# Patient Record
Sex: FEMALE | Race: BLACK OR AFRICAN AMERICAN | Employment: UNEMPLOYED | URBAN - METROPOLITAN AREA
[De-identification: names, ages, dates, MRNs, and addresses within clinical notes are randomized per-mention and may not be internally consistent; named-entity substitution may affect disease eponyms.]

---

## 2022-02-04 ENCOUNTER — OFFICE VISIT (OUTPATIENT)
Dept: PRIMARY CARE CLINIC | Age: 47
End: 2022-02-04
Payer: COMMERCIAL

## 2022-02-04 VITALS
OXYGEN SATURATION: 100 % | WEIGHT: 285.6 LBS | SYSTOLIC BLOOD PRESSURE: 137 MMHG | DIASTOLIC BLOOD PRESSURE: 85 MMHG | HEART RATE: 67 BPM | HEIGHT: 65 IN | BODY MASS INDEX: 47.58 KG/M2 | RESPIRATION RATE: 20 BRPM | TEMPERATURE: 97.8 F

## 2022-02-04 DIAGNOSIS — N32.81 OVERACTIVE BLADDER: ICD-10-CM

## 2022-02-04 DIAGNOSIS — M25.511 CHRONIC PAIN IN RIGHT SHOULDER: ICD-10-CM

## 2022-02-04 DIAGNOSIS — M54.42 CHRONIC MIDLINE LOW BACK PAIN WITH LEFT-SIDED SCIATICA: ICD-10-CM

## 2022-02-04 DIAGNOSIS — G89.29 CHRONIC MIDLINE LOW BACK PAIN WITH LEFT-SIDED SCIATICA: ICD-10-CM

## 2022-02-04 DIAGNOSIS — M06.9 RHEUMATOID ARTHRITIS INVOLVING MULTIPLE SITES, UNSPECIFIED WHETHER RHEUMATOID FACTOR PRESENT (HCC): Primary | ICD-10-CM

## 2022-02-04 DIAGNOSIS — I10 PRIMARY HYPERTENSION: ICD-10-CM

## 2022-02-04 DIAGNOSIS — G89.29 CHRONIC PAIN IN RIGHT SHOULDER: ICD-10-CM

## 2022-02-04 DIAGNOSIS — E66.01 MORBID OBESITY (HCC): ICD-10-CM

## 2022-02-04 PROBLEM — D50.9 IRON DEFICIENCY ANEMIA: Status: ACTIVE | Noted: 2017-03-02

## 2022-02-04 PROCEDURE — 99204 OFFICE O/P NEW MOD 45 MIN: CPT | Performed by: NURSE PRACTITIONER

## 2022-02-04 RX ORDER — TRAMADOL HYDROCHLORIDE 50 MG/1
50 TABLET ORAL
Qty: 21 TABLET | Refills: 0 | Status: SHIPPED | OUTPATIENT
Start: 2022-02-04 | End: 2022-02-11

## 2022-02-04 RX ORDER — ESTRADIOL 1 MG/1
TABLET ORAL
COMMUNITY
Start: 2022-02-01 | End: 2022-02-04 | Stop reason: ALTCHOICE

## 2022-02-04 RX ORDER — DICLOFENAC SODIUM 10 MG/G
2 GEL TOPICAL 4 TIMES DAILY
Qty: 450 G | Refills: 1 | Status: SHIPPED | OUTPATIENT
Start: 2022-02-04

## 2022-02-04 RX ORDER — DULOXETIN HYDROCHLORIDE 30 MG/1
30 CAPSULE, DELAYED RELEASE ORAL DAILY
Qty: 30 CAPSULE | Refills: 2 | Status: SHIPPED | OUTPATIENT
Start: 2022-02-04 | End: 2022-05-02

## 2022-02-04 NOTE — PROGRESS NOTES
Nathalia Red is a 50 y.o. female who presents to the office today for the following:    Chief Complaint   Patient presents with    New Patient    Hypertension       Past Medical History:   Diagnosis Date    Bursitis     Generalized anxiety disorder 2011    Hypertension     Iron deficiency anemia 3/2/2017    Major depression, single episode 10/26/2016    RA (rheumatoid arthritis) ([address]     Sleep apnea 2013       Past Surgical History:   Procedure Laterality Date    HX  SECTION      HX CHOLECYSTECTOMY      HX HYSTERECTOMY      HX SHOULDER ARTHROSCOPY      HX TONSIL AND ADENOIDECTOMY      HX TUBAL LIGATION          Family History   Problem Relation Age of Onset    Hypertension Mother     Kidney Disease Mother     Hypertension Father         Social History     Tobacco Use    Smoking status: Never Smoker    Smokeless tobacco: Never Used   Substance Use Topics    Alcohol use: Never    Drug use: Not on file        HPI  Patient here today as new patient to provider with PMH of RA, chronic back pain,anemia, sleep apnea, hypertension, anxiety,obesity,overactive bladder and chronic right shoulder pain s/p rotator cuff repair. States that she was following with a provider in [facility] but wants to switch pcp. Also followed by rheumatologist and orthopedic for right shoulder. Also has seen an orthopedic about her back as well. Reports that primary concerns have been regarding pain in low back as well as right shoulder. Had right shoulder rotator cuff repair in the past year and has finished PT. Still experiences pain however in shoulder. Pain in back radiates into legs and sometimes experiences tingling but not constant down legs. Has numbness in her right  thigh which has been unrelated to back pain and was present after she had hysterectomy. Told that a nerve was \"injured\" after hysterectomy. Given cortisone shots in back but these have not helped.  Has had trouble taking NSAIDS and tylenol not helpful. Was given tramadol by her rheumatologist several times and this does help. Could not tolerate gabapaentin or lyrica also tried in past. Has had xrays done of lumbar with last sometime in 2021. No MRI or CT done. Was advised by her rheumatologist that she needs to go to pain management. Current Outpatient Medications on File Prior to Visit   Medication Sig    oxybutynin (DITROPAN) 5 mg tablet Take 5 mg by mouth two (2) times a day.  methotrexate (RHEUMATREX) 2.5 mg tablet TAKE 8 TABLETS BY MOUTH EVERY 7 DAYS**4 TABS TUESDAY MORNING AND 4 TABS TUESDAY EVENING    losartan (COZAAR) 50 mg tablet Take 50 mg by mouth daily.  hydroCHLOROthiazide (HYDRODIURIL) 25 mg tablet Take 25 mg by mouth daily.  folic acid (FOLVITE) 1 mg tablet Take 1 mg by mouth daily.  Vitamin D2 1,250 mcg (50,000 unit) capsule take 1 capsule by mouth every week (Patient not taking: Reported on 2/4/2022)     No current facility-administered medications on file prior to visit. Medications Ordered Today   Medications    DULoxetine (CYMBALTA) 30 mg capsule     Sig: Take 1 Capsule by mouth daily. Dispense:  30 Capsule     Refill:  2    diclofenac (VOLTAREN) 1 % gel     Sig: Apply 2 g to affected area four (4) times daily. Dispense:  450 g     Refill:  1    traMADoL (ULTRAM) 50 mg tablet     Sig: Take 1 Tablet by mouth every eight (8) hours as needed for Pain for up to 7 days. Max Daily Amount: 150 mg. Dispense:  21 Tablet     Refill:  0        Review of Systems   Constitutional: Negative for chills, fever and weight loss. HENT: Negative. Eyes: Negative. Respiratory: Negative. Cardiovascular: Negative. Gastrointestinal: Negative. Genitourinary: Negative for dysuria, flank pain, frequency, hematuria and urgency. Musculoskeletal: Positive for back pain, joint pain and myalgias. Skin: Negative. Neurological: Positive for tingling.  Negative for dizziness, speech change, focal weakness, loss of consciousness, weakness and headaches. Visit Vitals  /85 (BP 1 Location: Left upper arm, BP Patient Position: Sitting, BP Cuff Size: Adult)   Pulse 67   Temp 97.8 °F (36.6 °C) (Temporal)   Resp 20   Ht 5' 5\" (1.651 m)   Wt 285 lb 9.6 oz (129.5 kg)   SpO2 100%   BMI 47.53 kg/m²       Physical Exam  Vitals and nursing note reviewed. Constitutional:       Appearance: Normal appearance. She is obese. Eyes:      Pupils: Pupils are equal, round, and reactive to light. Neck:      Vascular: No carotid bruit. Cardiovascular:      Rate and Rhythm: Normal rate and regular rhythm. Pulses: Normal pulses. Heart sounds: Normal heart sounds. Pulmonary:      Effort: Pulmonary effort is normal.      Breath sounds: Normal breath sounds. Abdominal:      General: Bowel sounds are normal.      Palpations: Abdomen is soft. Tenderness: There is no abdominal tenderness. There is no guarding. Musculoskeletal:      Cervical back: Normal.      Thoracic back: Normal.      Lumbar back: Spasms and tenderness present. Decreased range of motion. Positive right straight leg raise test and positive left straight leg raise test.      Right lower leg: No edema. Left lower leg: No edema. Lymphadenopathy:      Cervical: No cervical adenopathy. Skin:     General: Skin is warm and dry. Neurological:      Mental Status: She is alert and oriented to person, place, and time. Mental status is at baseline. Sensory: No sensory deficit. Gait: Gait abnormal.      Deep Tendon Reflexes: Reflexes abnormal.   Psychiatric:         Mood and Affect: Mood normal.         Behavior: Behavior normal.              1. Rheumatoid arthritis involving multiple sites, unspecified whether rheumatoid factor present ([facility])    - traMADoL (ULTRAM) 50 mg tablet; Take 1 Tablet by mouth every eight (8) hours as needed for Pain for up to 7 days. Max Daily Amount: 150 mg. Dispense: 21 Tablet;  Refill: 0  - CBC WITH AUTOMATED DIFF  - METABOLIC PANEL, COMPREHENSIVE  - URINALYSIS W/ RFLX MICROSCOPIC  - LIPID PANEL    2. Primary hypertension      3. Overactive bladder      4. Chronic midline low back pain with left-sided sciatica    - REFERRAL TO PHYSICAL THERAPY  - REFERRAL TO PAIN MANAGEMENT  - MRI LUMB SPINE WO CONT; Future  - DULoxetine (CYMBALTA) 30 mg capsule; Take 1 Capsule by mouth daily. Dispense: 30 Capsule; Refill: 2  - diclofenac (VOLTAREN) 1 % gel; Apply 2 g to affected area four (4) times daily. Dispense: 450 g; Refill: 1  - traMADoL (ULTRAM) 50 mg tablet; Take 1 Tablet by mouth every eight (8) hours as needed for Pain for up to 7 days. Max Daily Amount: 150 mg. Dispense: 21 Tablet; Refill: 0    5. Chronic pain in right shoulder    - REFERRAL TO PAIN MANAGEMENT    6. Morbid Obesity ([address]    Reviewed available notes in Epic from rheumatologist and an orthopedic she saw in 5/2021  Is on methotrexate for RA  She has completed PT and had right rotator cuff repair which has not relieved pain in shoulder  Also has had chronic pain in lumbar which she has received cortisone shots for and some PT as well  Report of xray of lumbar spine from  5/2021 states she appears to have normal alignment and SI Joints. Mild narrowing of the L4-5 and L5-S1 disc spaces with early spondylosis. Has not had MRI done though it was ordered in past. Given radicular symptoms, advised we can re-order MRI of lumbar to evaluate further  Will also refer back to PT to evaluate and treat  Encouraged to work on weight loss as this contributes and at this time she would likely not be a surgical candidate even if indicated. We discussed ways to help reduce excess carbs, fat and salt in diet along with staying active as possible  Could not tolerate gabapentin or lyrica in past per patient. Will try on cymbalta as feel this could be beneficial and she agrees.  Reviewed potential side effects and advised if develops mood changes or suicidal thoughts, notify provider or seek help immediately. Has been given tramadol several times and is requesting tramadol for pain today. Advised cannot prescribe this long term and I am going to refill once but informed to use only very sparingly   Will [facility] to [facility] to evaluate further  Recommend RTO for updated annual labs as not done in past year. Controlled Substance Monitoring:    RX Monitoring 2/4/2022   Periodic Controlled Substance Monitoring Possible medication side effects, risk of tolerance/dependence & alternative treatments discussed. ;No signs of potential drug abuse or diversion identified. Patient verbalizes understanding of plan of care as discussed above    Follow-up and Dispositions    · Return in about 4 weeks (around 3/4/2022) for or sooner for worsening symptoms.

## 2022-02-04 NOTE — PROGRESS NOTES
Chief Complaint   Patient presents with    New Patient    Hypertension     New pt, pt has an Rhuemtologist Dr. Vilma Canavan    1. Have you been to the ER, urgent care clinic since your last visit? Hospitalized since your last visit? No    2. Have you seen or consulted any other health care providers outside of the [address] [place] [address] since your last visit? Include any pap smears or colon screening.  Anmol

## 2022-02-13 PROBLEM — D50.9 IRON DEFICIENCY ANEMIA: Status: RESOLVED | Noted: 2017-03-02 | Resolved: 2022-02-13

## 2022-02-15 ENCOUNTER — HOSPITAL ENCOUNTER (OUTPATIENT)
Dept: MRI IMAGING | Age: 47
Discharge: HOME | End: 2022-02-15
Attending: NURSE PRACTITIONER
Payer: COMMERCIAL

## 2022-02-15 ENCOUNTER — HOSPITAL ENCOUNTER (OUTPATIENT)
Dept: PHYSICAL THERAPY | Age: 47
End: 2022-02-15

## 2022-02-15 DIAGNOSIS — M54.42 CHRONIC MIDLINE LOW BACK PAIN WITH LEFT-SIDED SCIATICA: ICD-10-CM

## 2022-02-15 DIAGNOSIS — G89.29 CHRONIC MIDLINE LOW BACK PAIN WITH LEFT-SIDED SCIATICA: ICD-10-CM

## 2022-02-15 PROCEDURE — 72148 MRI LUMBAR SPINE W/O DYE: CPT

## 2022-02-18 NOTE — PROGRESS NOTES
Please let patient know that MRI of lumbar does show degenerative disc disease at L4-L5 with focal disc protrusion on the right and possible compress of the right nerve. I did send referral to pain management and should carry copy of this.

## 2022-02-18 NOTE — PROGRESS NOTES
Patient informed of MRI results and recommendations per ANNALEE Bay NP. Patient stated she was coming by on Monday to  some papers and would go get disc that she is carrying to pain management. She did not have any questions or concerns.

## 2022-02-23 LAB
ALBUMIN SERPL-MCNC: 4.6 G/DL (ref 3.8–4.8)
ALBUMIN/GLOB SERPL: 1.4 {RATIO} (ref 1.2–2.2)
ALP SERPL-CCNC: 109 IU/L (ref 44–121)
ALT SERPL-CCNC: 12 IU/L (ref 0–32)
APPEARANCE UR: ABNORMAL
AST SERPL-CCNC: 29 IU/L (ref 0–40)
BASOPHILS # BLD AUTO: 0 X10E3/UL (ref 0–0.2)
BASOPHILS NFR BLD AUTO: 1 %
BILIRUB SERPL-MCNC: <0.2 MG/DL (ref 0–1.2)
BILIRUB UR QL STRIP: NEGATIVE
BUN SERPL-MCNC: 11 MG/DL (ref 6–24)
BUN/CREAT SERPL: 14 (ref 9–23)
CALCIUM SERPL-MCNC: 9.6 MG/DL (ref 8.7–10.2)
CHLORIDE SERPL-SCNC: 100 MMOL/L (ref 96–106)
CHOLEST SERPL-MCNC: 218 MG/DL (ref 100–199)
CO2 SERPL-SCNC: 26 MMOL/L (ref 20–29)
COLOR UR: YELLOW
CREAT SERPL-MCNC: 0.78 MG/DL (ref 0.57–1)
EOSINOPHIL # BLD AUTO: 0.1 X10E3/UL (ref 0–0.4)
EOSINOPHIL NFR BLD AUTO: 3 %
ERYTHROCYTE [DISTWIDTH] IN BLOOD BY AUTOMATED COUNT: 15.5 % (ref 11.7–15.4)
GLOBULIN SER CALC-MCNC: 3.2 G/DL (ref 1.5–4.5)
GLUCOSE SERPL-MCNC: 95 MG/DL (ref 65–99)
GLUCOSE UR QL STRIP: NEGATIVE
HCT VFR BLD AUTO: 36.6 % (ref 34–46.6)
HDLC SERPL-MCNC: 44 MG/DL
HGB BLD-MCNC: 11.7 G/DL (ref 11.1–15.9)
HGB UR QL STRIP: NEGATIVE
IMM GRANULOCYTES # BLD AUTO: 0 X10E3/UL (ref 0–0.1)
IMM GRANULOCYTES NFR BLD AUTO: 0 %
KETONES UR QL STRIP: NEGATIVE
LDLC SERPL CALC-MCNC: 141 MG/DL (ref 0–99)
LEUKOCYTE ESTERASE UR QL STRIP: NEGATIVE
LYMPHOCYTES # BLD AUTO: 2 X10E3/UL (ref 0.7–3.1)
LYMPHOCYTES NFR BLD AUTO: 38 %
MCH RBC QN AUTO: 25.4 PG (ref 26.6–33)
MCHC RBC AUTO-ENTMCNC: 32 G/DL (ref 31.5–35.7)
MCV RBC AUTO: 79 FL (ref 79–97)
MICRO URNS: ABNORMAL
MONOCYTES # BLD AUTO: 0.4 X10E3/UL (ref 0.1–0.9)
MONOCYTES NFR BLD AUTO: 7 %
NEUTROPHILS # BLD AUTO: 2.7 X10E3/UL (ref 1.4–7)
NEUTROPHILS NFR BLD AUTO: 51 %
NITRITE UR QL STRIP: NEGATIVE
PH UR STRIP: 6 [PH] (ref 5–7.5)
PLATELET # BLD AUTO: 232 X10E3/UL (ref 150–450)
POTASSIUM SERPL-SCNC: 4.5 MMOL/L (ref 3.5–5.2)
PROT SERPL-MCNC: 7.8 G/DL (ref 6–8.5)
PROT UR QL STRIP: NEGATIVE
RBC # BLD AUTO: 4.61 X10E6/UL (ref 3.77–5.28)
SODIUM SERPL-SCNC: 140 MMOL/L (ref 134–144)
SP GR UR STRIP: 1.02 (ref 1–1.03)
TRIGL SERPL-MCNC: 182 MG/DL (ref 0–149)
UROBILINOGEN UR STRIP-MCNC: 0.2 MG/DL (ref 0.2–1)
VLDLC SERPL CALC-MCNC: 33 MG/DL (ref 5–40)
WBC # BLD AUTO: 5.2 X10E3/UL (ref 3.4–10.8)

## 2022-02-23 NOTE — PROGRESS NOTES
Please let patient know that bad cholesterol is high at 141 with total 218. The 10-year ASCVD risk score (Riley Gutierrez., et al., 2013) is: 6.4% so I am recommending that she continue to work on following low cholesterol diet along with increasing exercise to help reduce. Repeat lipid panel in 6-12 months. Otherwise labs are stable, if any questions let me know.

## 2022-03-22 ENCOUNTER — OFFICE VISIT (OUTPATIENT)
Dept: PRIMARY CARE CLINIC | Age: 47
End: 2022-03-22
Payer: COMMERCIAL

## 2022-03-22 DIAGNOSIS — E66.01 MORBID OBESITY (HCC): ICD-10-CM

## 2022-03-22 DIAGNOSIS — M54.42 CHRONIC MIDLINE LOW BACK PAIN WITH LEFT-SIDED SCIATICA: Primary | ICD-10-CM

## 2022-03-22 DIAGNOSIS — G89.29 CHRONIC MIDLINE LOW BACK PAIN WITH LEFT-SIDED SCIATICA: Primary | ICD-10-CM

## 2022-03-22 PROCEDURE — 99214 OFFICE O/P EST MOD 30 MIN: CPT | Performed by: NURSE PRACTITIONER

## 2022-03-22 RX ORDER — ESTRADIOL 1 MG/1
1 TABLET ORAL DAILY
COMMUNITY
Start: 2022-02-28

## 2022-03-22 NOTE — PROGRESS NOTES
Chief Complaint   Patient presents with    Medication Evaluation    Hypertension     6 week f/u pt did not take her bp meds today yet    1. Have you been to the ER, urgent care clinic since your last visit? Hospitalized since your last visit? No    2. Have you seen or consulted any other health care providers outside of the [address] [place] [address] since your last visit? Include any pap smears or colon screening.  No

## 2022-03-22 NOTE — PROGRESS NOTES
Jennifer Thapa is a 50 y.o. female who presents to the office today for the following:    Chief Complaint   Patient presents with    Back Pain       Past Medical History:   Diagnosis Date    Bursitis     Generalized anxiety disorder 2011    Hypertension     Iron deficiency anemia 3/2/2017    Major depression, single episode 10/26/2016    RA [facility]oid [facility]) [facility]     Sleep apnea 2013       Past Surgical History:   Procedure Laterality Date    HX  SECTION      HX CHOLECYSTECTOMY      HX HYSTERECTOMY      HX SHOULDER ARTHROSCOPY      HX TONSIL AND ADENOIDECTOMY      HX TUBAL LIGATION          Family History   Problem Relation Age of Onset    Hypertension Mother     Kidney Disease Mother     Hypertension Father         Social History     Tobacco Use    Smoking status: Never Smoker    Smokeless tobacco: Never Used   Substance Use Topics    Alcohol use: Never    Drug use: Not on file        HPI  Patient here today for 1 month follow up of chronic back pain with  PMH of RA, chronic back pain,anemia, sleep apnea, hypertension, anxiety,obesity,overactive bladder and chronic right shoulder pain s/p rotator cuff repair. States that she was seen by [facility] and reports she was told to have drug test done as well to continue working on weight loss. Is not sure what plan may be outside of that. Did attempt to go to physical therapy but was not able to continue due to family conflict. Wants to see a spinal specialist as she does not feel she is getting adequate answers. Current Outpatient Medications on File Prior to Visit   Medication Sig    estradioL (ESTRACE) 1 mg tablet Take 1 mg by mouth daily.  DULoxetine (CYMBALTA) 30 mg capsule Take 1 Capsule by mouth daily.  diclofenac (VOLTAREN) 1 % gel Apply 2 g to affected area four (4) times daily.  oxybutynin (DITROPAN) 5 mg tablet Take 5 mg by mouth two (2) times a day.     methotrexate (RHEUMATREX) 2.5 mg tablet TAKE 8 TABLETS BY MOUTH EVERY 7 DAYS**4 TABS TUESDAY MORNING AND 4 TABS TUESDAY EVENING    losartan (COZAAR) 50 mg tablet Take 50 mg by mouth daily.  hydroCHLOROthiazide (HYDRODIURIL) 25 mg tablet Take 25 mg by mouth daily.  folic acid (FOLVITE) 1 mg tablet Take 1 mg by mouth daily. No current facility-administered medications on file prior to visit. No orders of the defined types were placed in this encounter. Review of Systems   Constitutional: Negative for chills, fever and weight loss. HENT: Negative. Eyes: Negative. Respiratory: Negative. Cardiovascular: Negative. Gastrointestinal: Negative. Genitourinary: Negative for dysuria, flank pain, frequency, hematuria and urgency. Musculoskeletal: Positive for back pain, joint pain and myalgias. Skin: Negative. Neurological: Positive for tingling. Negative for dizziness, speech change, focal weakness, loss of consciousness, weakness and headaches. Visit Vitals  /89   Pulse 74   Temp 97.3 °F (36.3 °C) (Temporal)   Resp 18   Ht 5' 5\" (1.651 m)   Wt 278 lb 3.2 oz (126.2 kg)   SpO2 98%   BMI 46.29 kg/m²       Physical Exam  Vitals and nursing note reviewed. Constitutional:       Appearance: Normal appearance. She is obese. Eyes:      Pupils: Pupils are equal, round, and reactive to light. Neck:      Vascular: No carotid bruit. Cardiovascular:      Rate and Rhythm: Normal rate and regular rhythm. Pulses: Normal pulses. Heart sounds: Normal heart sounds. Pulmonary:      Effort: Pulmonary effort is normal.      Breath sounds: Normal breath sounds. Abdominal:      General: Bowel sounds are normal.      Palpations: Abdomen is soft. Tenderness: There is no abdominal tenderness. There is no guarding. Musculoskeletal:      Cervical back: Normal.      Thoracic back: Normal.      Lumbar back: Spasms and tenderness present. Decreased range of motion.  Positive right straight leg raise test and positive left straight leg raise test.      Right lower leg: No edema. Left lower leg: No edema. Lymphadenopathy:      Cervical: No cervical adenopathy. Skin:     General: Skin is warm and dry. Neurological:      Mental Status: She is alert and oriented to person, place, and time. Mental status is at baseline. Sensory: No sensory deficit. Gait: Gait abnormal.      Deep Tendon Reflexes: Reflexes abnormal.   Psychiatric:         Mood and Affect: Mood normal.         Behavior: Behavior normal.            1. Chronic midline low back pain with left-sided sciatica  MRI Results (most recent):  Results from Hospital Encounter encounter on 02/15/22    MRI LUMB SPINE WO CONT    Narrative  MRI LUMBAR SPINE WITHOUT INTRAVENOUS CONTRAST:    CLINICAL HISTORY:  Back pain radiating into the left leg. T1 and T2 weighted images were obtained in the axial and sagittal planes. At the L1-L2 level , no disc herniation or central or foraminal stenoses is  noted. At the L2-L3 level, no disc herniation or central or foraminal stenoses is  noted. Mild degenerative changes of the facet joints are noted. At the L3-L4 level, mild bulging of the posterior annulus of the disc is noted. Mild to moderate degenerative changes of the facet joints are noted. No central  or foraminal stenoses is noted. At the L4-L5 level,  desiccation and mild narrowing of the disc space is noted. Central and to the right disc protrusion is noted with possible compression of  the right L5 nerve in the right lateral recess. Mild foraminal stenoses is noted  bilaterally. Mild degenerative changes of the facet joints and mild thickening  of the ligamentum flavum are causing mild central stenosis. At the L5-S1 level,  mild bulging of the posterior annulus of the disc is noted. Mild foraminal stenoses on the left is noted. Mild to moderate degenerative  changes of the facet joints are noted.  No significant central stenoses is noted. The conus medullaris  has a normal MR appearance. Tip of conus medullaris  terminates slightly above the L2-L3 disc space. The visualized bone marrow has a normal MR appearance. No acute or chronic  fracture or spondylolisthesis was demonstrated. Impression  Degenerative disc disease L4-L5 with  focal disc protrusion on the  right and possible compression of the right L5 nerve within the right lateral  recess. Mild central stenosis. - REFERRAL TO SPINE SURGERY    2. Morbid obesity ([address]      She is going to continue care with pain management but also requesting to see spinal surgeon for eval  Continue to encourage weight loss with decreasing excess fat, salt and sugar in diet along with getting low impact regular exercise as tolerated        Patient verbalizes understanding of plan of care as discussed above    Follow-up and Dispositions    · Return in about 3 months (around 6/22/2022) for or sooner for worsening symptoms.

## 2022-03-31 VITALS
DIASTOLIC BLOOD PRESSURE: 89 MMHG | HEIGHT: 65 IN | HEART RATE: 74 BPM | WEIGHT: 278.2 LBS | BODY MASS INDEX: 46.35 KG/M2 | RESPIRATION RATE: 18 BRPM | TEMPERATURE: 97.3 F | SYSTOLIC BLOOD PRESSURE: 125 MMHG | OXYGEN SATURATION: 98 %

## 2022-05-02 DIAGNOSIS — M54.42 CHRONIC MIDLINE LOW BACK PAIN WITH LEFT-SIDED SCIATICA: ICD-10-CM

## 2022-05-02 DIAGNOSIS — G89.29 CHRONIC MIDLINE LOW BACK PAIN WITH LEFT-SIDED SCIATICA: ICD-10-CM

## 2022-05-02 RX ORDER — DULOXETIN HYDROCHLORIDE 30 MG/1
CAPSULE, DELAYED RELEASE ORAL
Qty: 30 CAPSULE | Refills: 2 | Status: SHIPPED | OUTPATIENT
Start: 2022-05-02 | End: 2022-08-01

## 2022-05-31 NOTE — TELEPHONE ENCOUNTER
Requested Prescriptions     Pending Prescriptions Disp Refills    hydroCHLOROthiazide (HYDRODIURIL) 25 mg tablet       Sig: Take 1 Tablet by mouth daily.  losartan (COZAAR) 50 mg tablet       Sig: Take 1 Tablet by mouth daily.

## 2022-06-01 RX ORDER — HYDROCHLOROTHIAZIDE 25 MG/1
25 TABLET ORAL DAILY
Qty: 90 TABLET | Refills: 1 | Status: SHIPPED | OUTPATIENT
Start: 2022-06-01

## 2022-06-01 RX ORDER — LOSARTAN POTASSIUM 50 MG/1
50 TABLET ORAL DAILY
Qty: 90 TABLET | Refills: 1 | Status: SHIPPED | OUTPATIENT
Start: 2022-06-01

## 2022-06-06 ENCOUNTER — TELEPHONE (OUTPATIENT)
Dept: PRIMARY CARE CLINIC | Age: 47
End: 2022-06-06

## 2022-06-06 DIAGNOSIS — M54.50 ACUTE BILATERAL LOW BACK PAIN, UNSPECIFIED WHETHER SCIATICA PRESENT: Primary | ICD-10-CM

## 2022-06-06 RX ORDER — TRAMADOL HYDROCHLORIDE 50 MG/1
50 TABLET ORAL
Qty: 12 TABLET | Refills: 0 | Status: SHIPPED | OUTPATIENT
Start: 2022-06-06 | End: 2022-06-09

## 2022-06-06 NOTE — TELEPHONE ENCOUNTER
Spoke with patient via phone regarding concerns. I have reviewed the spine specialist notes from  (Dr. Ma Members). She was advised to continue care with Dr. Smith Daily prn for injections. Reports that injections have not relieved pain and she does not want to move forward with any surgery. Has tried multiple non-narcotic medication options and feels none have helped. Declines re-trial with gabapentin or Lyrica. NSAIDS need to be limited due to problems with stomach. Does not feel tylenol, topical rubs/lidocaine patches effective. Feels that she is not being helped adequately by pain management provider (Dr. Smith Daily). Has been to Dr. Dagmar Fuentes with pain management also. Is requesting pain medication and as we had discussed previously, long term opioid medication cannot be prescribed by provider which is reason for referrals to pain management. I have agreed to a short course of tramadol that she may use sparingly due to reported acute pain episode over past several days. Reviewed side effects and not to drive or operate machinery when taking. Also should not take with other sedating medication as could be life threatening. We discussed referral to another pain specialist in [place] for additional opinion as she does not feel she is being helped but declines. She will let us know at later date if agrees. Did advise that we would like to continue helping her with medical needs otherwise.

## 2022-06-13 ENCOUNTER — TELEPHONE (OUTPATIENT)
Dept: PRIMARY CARE CLINIC | Age: 47
End: 2022-06-13

## 2022-06-13 NOTE — TELEPHONE ENCOUNTER
Please contact Dr. Yaneli Prater office regarding patient. I received notes discussing evaluation of chronic back pain. Patient pain still uncontrolled and it was noted that she was advised to follow up with pcp for medication options. We have attempted all non-narcotic options and was referred there to evaluate for pain control as standard treatments not effective. Would like to see if he can contact patient to discuss recommendations for pain control or if he has recommendations on who she should see as pain is not controlled with non-narcotic options.

## 2022-06-13 NOTE — TELEPHONE ENCOUNTER
Spoke with RASHMI at [phone] (Dr. Meza Sage) and asked if she would give him a message stating, We have attempted all non-narcotic options and patient was referred there to be evaluated for pain control as standard treatments are not effective. Would like to know if he can reach out to patient to discuss recommendations for pain control or if can recommend someone she should see as pain is not controlled with non-narcotic options. Rashmi stated understanding.

## 2022-08-01 DIAGNOSIS — G89.29 CHRONIC MIDLINE LOW BACK PAIN WITH LEFT-SIDED SCIATICA: ICD-10-CM

## 2022-08-01 DIAGNOSIS — M54.42 CHRONIC MIDLINE LOW BACK PAIN WITH LEFT-SIDED SCIATICA: ICD-10-CM

## 2022-08-01 RX ORDER — DULOXETIN HYDROCHLORIDE 30 MG/1
CAPSULE, DELAYED RELEASE ORAL
Qty: 30 CAPSULE | Refills: 2 | Status: SHIPPED | OUTPATIENT
Start: 2022-08-01

## 2022-09-14 ENCOUNTER — OFFICE VISIT (OUTPATIENT)
Dept: PRIMARY CARE CLINIC | Age: 47
End: 2022-09-14
Payer: COMMERCIAL

## 2022-09-14 VITALS
RESPIRATION RATE: 20 BRPM | HEART RATE: 81 BPM | HEIGHT: 65 IN | BODY MASS INDEX: 43.99 KG/M2 | WEIGHT: 264 LBS | OXYGEN SATURATION: 97 % | SYSTOLIC BLOOD PRESSURE: 139 MMHG | DIASTOLIC BLOOD PRESSURE: 87 MMHG | TEMPERATURE: 97.4 F

## 2022-09-14 DIAGNOSIS — M54.42 CHRONIC MIDLINE LOW BACK PAIN WITH LEFT-SIDED SCIATICA: Primary | ICD-10-CM

## 2022-09-14 DIAGNOSIS — G89.29 CHRONIC MIDLINE LOW BACK PAIN WITH LEFT-SIDED SCIATICA: Primary | ICD-10-CM

## 2022-09-14 DIAGNOSIS — E66.01 MORBID OBESITY (HCC): ICD-10-CM

## 2022-09-14 PROBLEM — M19.011 ARTHRITIS OF RIGHT ACROMIOCLAVICULAR JOINT: Status: ACTIVE | Noted: 2020-06-30

## 2022-09-14 PROCEDURE — 99213 OFFICE O/P EST LOW 20 MIN: CPT | Performed by: NURSE PRACTITIONER

## 2022-09-14 NOTE — PROGRESS NOTES
Chief Complaint   Patient presents with    Back Pain    Arthritis    Bursitis     Been like this for months just getting worse     1. Have you been to the ER, urgent care clinic since your last visit? Hospitalized since your last visit? No    2. Have you seen or consulted any other health care providers outside of the [address] [place] [address] since your last visit? Include any pap smears or colon screening.   In chart

## 2022-11-02 ENCOUNTER — TELEPHONE (OUTPATIENT)
Dept: PRIMARY CARE CLINIC | Age: 47
End: 2022-11-02

## 2022-11-02 DIAGNOSIS — N32.81 OVERACTIVE BLADDER: Primary | ICD-10-CM

## 2022-11-02 RX ORDER — OXYBUTYNIN CHLORIDE 5 MG/1
5 TABLET ORAL 2 TIMES DAILY
Qty: 180 TABLET | Refills: 1 | Status: SHIPPED | OUTPATIENT
Start: 2022-11-02

## 2022-11-07 DIAGNOSIS — M54.42 CHRONIC MIDLINE LOW BACK PAIN WITH LEFT-SIDED SCIATICA: ICD-10-CM

## 2022-11-07 DIAGNOSIS — G89.29 CHRONIC MIDLINE LOW BACK PAIN WITH LEFT-SIDED SCIATICA: ICD-10-CM

## 2022-11-07 RX ORDER — DULOXETIN HYDROCHLORIDE 30 MG/1
CAPSULE, DELAYED RELEASE ORAL
Qty: 30 CAPSULE | Refills: 2 | Status: SHIPPED | OUTPATIENT
Start: 2022-11-07

## 2022-12-07 RX ORDER — LOSARTAN POTASSIUM 50 MG/1
TABLET ORAL
Qty: 90 TABLET | Refills: 1 | Status: SHIPPED | OUTPATIENT
Start: 2022-12-07

## 2022-12-15 ENCOUNTER — HOSPITAL ENCOUNTER (EMERGENCY)
Age: 47
Discharge: HOME | End: 2022-12-15
Attending: EMERGENCY MEDICINE
Payer: COMMERCIAL

## 2022-12-15 ENCOUNTER — APPOINTMENT (OUTPATIENT)
Dept: GENERAL RADIOLOGY | Age: 47
End: 2022-12-15
Attending: EMERGENCY MEDICINE
Payer: COMMERCIAL

## 2022-12-15 VITALS
TEMPERATURE: 98.1 F | BODY MASS INDEX: 44.15 KG/M2 | DIASTOLIC BLOOD PRESSURE: 104 MMHG | RESPIRATION RATE: 18 BRPM | SYSTOLIC BLOOD PRESSURE: 163 MMHG | WEIGHT: 265 LBS | HEIGHT: 65 IN | HEART RATE: 68 BPM | OXYGEN SATURATION: 100 %

## 2022-12-15 DIAGNOSIS — S86.912A KNEE STRAIN, LEFT, INITIAL ENCOUNTER: Primary | ICD-10-CM

## 2022-12-15 PROCEDURE — 99284 EMERGENCY DEPT VISIT MOD MDM: CPT

## 2022-12-15 PROCEDURE — 73560 X-RAY EXAM OF KNEE 1 OR 2: CPT

## 2022-12-15 PROCEDURE — 96372 THER/PROPH/DIAG INJ SC/IM: CPT

## 2022-12-15 PROCEDURE — 74011250636 HC RX REV CODE- 250/636

## 2022-12-15 RX ORDER — KETOROLAC TROMETHAMINE 30 MG/ML
60 INJECTION, SOLUTION INTRAMUSCULAR; INTRAVENOUS
Status: COMPLETED | OUTPATIENT
Start: 2022-12-15 | End: 2022-12-15

## 2022-12-15 RX ORDER — KETOROLAC TROMETHAMINE 30 MG/ML
INJECTION, SOLUTION INTRAMUSCULAR; INTRAVENOUS
Status: COMPLETED
Start: 2022-12-15 | End: 2022-12-15

## 2022-12-15 RX ADMIN — KETOROLAC TROMETHAMINE 60 MG: 30 INJECTION, SOLUTION INTRAMUSCULAR; INTRAVENOUS at 14:05

## 2022-12-15 RX ADMIN — KETOROLAC TROMETHAMINE 60 MG: 30 INJECTION, SOLUTION INTRAMUSCULAR at 14:05

## 2022-12-15 NOTE — ED TRIAGE NOTES
Pt reports left knee pain after feeling a \"pop\" this morning. Pt rates pain 8/10. Arrived with brace from home. Pt did not take any meds PTA.

## 2022-12-15 NOTE — ED PROVIDER NOTES
EMERGENCY DEPARTMENT HISTORY AND PHYSICAL EXAM      Date: 12/15/2022  Patient Name: Sarkis Sena    History of Presenting Illness     Chief Complaint   Patient presents with    Knee Pain       History Provided By: Patient    HPI: Sarkis Matthews. Tania Thapa, 50 y.o. female past medical history significant for rheumatoid arthritis, hypertension, obesity, patient states she was ambulating today when she felt a sudden pop and experiencing excruciating 10/10 pain to left knee, patient needed to put a brace on to ambulate better    There are no other complaints, changes, or physical findings at this time. Past History     Past Medical History:  Past Medical History:   Diagnosis Date    Bursitis     Generalized anxiety disorder 2011    Hypertension     Iron deficiency anemia 3/2/2017    Major depression, single episode 10/26/2016    RA [facility]umat[facility]     Sleep apnea 2013       Past Surgical History:  Past Surgical History:   Procedure Laterality Date    HX  SECTION      HX CHOLECYSTECTOMY      HX HYSTERECTOMY      HX SHOULDER ARTHROSCOPY      HX TONSIL AND ADENOIDECTOMY      HX TUBAL LIGATION         Family History:  Family History   Problem Relation Age of Onset    Hypertension Mother     Kidney Disease Mother     Hypertension Father        Social History:  Social History     Tobacco Use    Smoking status: Never    Smokeless tobacco: Never   Substance Use Topics    Alcohol use: Never       Allergies: Allergies   Allergen Reactions    Hydromorphone Other (comments)       PCP: Zayra Paulson NP    No current facility-administered medications on file prior to encounter.      Current Outpatient Medications on File Prior to Encounter   Medication Sig Dispense Refill    losartan (COZAAR) 50 mg tablet take 1 tablet by mouth once daily 90 Tablet 1    DULoxetine (CYMBALTA) 30 mg capsule take 1 capsule by mouth once daily 30 Capsule 2    oxybutynin (DITROPAN) 5 mg tablet Take 1 Tablet by mouth two (2) times a day. 180 Tablet 1    hydroCHLOROthiazide (HYDRODIURIL) 25 mg tablet Take 1 Tablet by mouth daily. 90 Tablet 1    estradioL (ESTRACE) 1 mg tablet Take 1 mg by mouth daily. diclofenac (VOLTAREN) 1 % gel Apply 2 g to affected area four (4) times daily. 450 g 1    methotrexate (RHEUMATREX) 2.5 mg tablet TAKE 8 TABLETS BY MOUTH EVERY 7 DAYS**4 TABS TUESDAY MORNING AND 4 TABS TUESDAY EVENING      folic acid (FOLVITE) 1 mg tablet Take 1 mg by mouth daily. Review of Systems   Review of Systems   Constitutional:  Negative for chills and fever. HENT:  Negative for rhinorrhea and sore throat. Eyes:  Negative for pain and visual disturbance. Respiratory:  Negative for cough and shortness of breath. Cardiovascular:  Negative for chest pain and leg swelling. Gastrointestinal:  Negative for abdominal pain and vomiting. Endocrine: Negative for polydipsia and polyuria. Genitourinary:  Negative for dysuria and hematuria. Musculoskeletal:  Positive for arthralgias. Negative for back pain and neck pain. Skin:  Negative for color change and pallor. Neurological:  Negative for weakness and headaches. Psychiatric/Behavioral:  Negative for agitation and suicidal ideas. Physical Exam   Physical Exam  Vitals and nursing note reviewed. Constitutional:       General: She is not in acute distress. Appearance: She is morbidly obese. She is not ill-appearing, toxic-appearing or diaphoretic. HENT:      Head: Normocephalic and atraumatic. Right Ear: Tympanic membrane normal.      Left Ear: Tympanic membrane normal.      Nose: Nose normal. No congestion. Mouth/Throat:      Mouth: Mucous membranes are moist.      Pharynx: Oropharynx is clear. Eyes:      Extraocular Movements: Extraocular movements intact. Conjunctiva/sclera: Conjunctivae normal.      Pupils: Pupils are equal, round, and reactive to light. Cardiovascular:      Rate and Rhythm: Normal rate and regular rhythm. Pulses: Normal pulses. Heart sounds: Normal heart sounds. Pulmonary:      Effort: Pulmonary effort is normal.      Breath sounds: Normal breath sounds. Abdominal:      General: Bowel sounds are normal.      Palpations: Abdomen is soft. Tenderness: There is no abdominal tenderness. Musculoskeletal:         General: No deformity or signs of injury. Cervical back: Normal range of motion and neck supple. No rigidity or tenderness. Right knee: Normal.      Left knee: Swelling present. No deformity or bony tenderness. Decreased range of motion. Tenderness present over the medial joint line. Lymphadenopathy:      Cervical: No cervical adenopathy. Skin:     General: Skin is warm and dry. Capillary Refill: Capillary refill takes less than 2 seconds. Findings: No rash. Neurological:      General: No focal deficit present. Mental Status: She is alert and oriented to person, place, and time. Cranial Nerves: No cranial nerve deficit. Sensory: No sensory deficit. Psychiatric:         Mood and Affect: Mood normal.         Behavior: Behavior normal.       Lab and Diagnostic Study Results   Labs -   No results found for this or any previous visit (from the past 12 hour(s)). Radiologic Studies -   @lastxrresult@  CT Results  (Last 48 hours)      None          CXR Results  (Last 48 hours)      None            Medical Decision Making and ED Course   Differential Diagnosis & Medical Decision Making Provider Note:   Knee injury DDx closed fracture, joint dislocation, ligamentous injury    - I am the first provider for this patient. I reviewed the vital signs, available nursing notes, past medical history, past surgical history, family history and social history. The patients presenting problems have been discussed, and they are in agreement with the care plan formulated and outlined with them.   I have encouraged them to ask questions as they arise throughout their visit.    Vital Signs-Reviewed the patient's vital signs. Patient Vitals for the past 12 hrs:   Temp Pulse Resp BP SpO2   12/15/22 1346 98.1 °F (36.7 °C) 83 17 (!) 198/98 100 %       ED Course:        HYPERTENSION COUNSELING: Education was provided to the patient today regarding their hypertension. Patient is made aware of their elevated blood pressure and is instructed to follow up this week with their Primary Care for a recheck. Patient is counseled regarding consequences of chronic, uncontrolled hypertension including kidney disease, heart disease, stroke or even death. Patient states their understanding and agrees to follow up this week. Additionally, during their visit, I discussed sodium restriction, maintaining ideal body weight and regular exercise program as physiologic means to achieve blood pressure control. The patient will strive towards this. Procedures   Performed by: mEily Montesinos MD  Procedures      Disposition   Disposition: Condition stable and improved  DC- Adult Discharges: All of the diagnostic tests were reviewed and questions answered. Diagnosis, care plan and treatment options were discussed. The patient understands the instructions and will follow up as directed. The patients results have been reviewed with them. They have been counseled regarding their diagnosis. The patient verbally convey understanding and agreement of the signs, symptoms, diagnosis, treatment and prognosis and additionally agrees to follow up as recommended with their PCP in 24 - 48 hours. They also agree with the care-plan and convey that all of their questions have been answered. I have also put together some discharge instructions for them that include: 1) educational information regarding their diagnosis, 2) how to care for their diagnosis at home, as well a 3) list of reasons why they would want to return to the ED prior to their follow-up appointment, should their condition change.     DISCHARGE PLAN:  1. Current Discharge Medication List        CONTINUE these medications which have NOT CHANGED    Details   losartan (COZAAR) 50 mg tablet take 1 tablet by mouth once daily  Qty: 90 Tablet, Refills: 1      DULoxetine (CYMBALTA) 30 mg capsule take 1 capsule by mouth once daily  Qty: 30 Capsule, Refills: 2    Associated Diagnoses: Chronic midline low back pain with left-sided sciatica      oxybutynin (DITROPAN) 5 mg tablet Take 1 Tablet by mouth two (2) times a day. Qty: 180 Tablet, Refills: 1    Associated Diagnoses: Overactive bladder      hydroCHLOROthiazide (HYDRODIURIL) 25 mg tablet Take 1 Tablet by mouth daily. Qty: 90 Tablet, Refills: 1      estradioL (ESTRACE) 1 mg tablet Take 1 mg by mouth daily. diclofenac (VOLTAREN) 1 % gel Apply 2 g to affected area four (4) times daily. Qty: 450 g, Refills: 1    Associated Diagnoses: Chronic midline low back pain with left-sided sciatica      methotrexate (RHEUMATREX) 2.5 mg tablet TAKE 8 TABLETS BY MOUTH EVERY 7 DAYS**4 TABS TUESDAY MORNING AND 4 TABS TUESDAY EVENING      folic acid (FOLVITE) 1 mg tablet Take 1 mg by mouth daily. 2.   Follow-up Information    None       3. Return to ED if worse   4. Current Discharge Medication List         Remove if admitted/transferred    Diagnosis/Clinical Impression     Clinical Impression: No diagnosis found. Attestations: Rickie DAVILA MD, am the primary clinician of record. Please note that this dictation was completed with [employer], the computer voice recognition software. Quite often unanticipated grammatical, syntax, homophones, and other interpretive errors are inadvertently transcribed by the computer software. Please disregard these errors. Please excuse any errors that have escaped final proofreading. Thank you.

## 2022-12-28 RX ORDER — HYDROCHLOROTHIAZIDE 25 MG/1
TABLET ORAL
Qty: 90 TABLET | Refills: 1 | Status: SHIPPED | OUTPATIENT
Start: 2022-12-28

## 2023-01-23 NOTE — PROGRESS NOTES
Called pharmacy to clarify prescription, it should be once a day.    Jennifer Pitts is a 50 y.o. female who presents to the office today for the following:    Chief Complaint   Patient presents with    Back Pain       Past Medical History:   Diagnosis Date    Bursitis     Generalized anxiety disorder 2011    Hypertension     Iron deficiency anemia 3/2/2017    Major depression, single episode 10/26/2016    RA (rheumatoid arthritis) ([facility]     Sleep apnea 2013       Past Surgical History:   Procedure Laterality Date    HX  SECTION      HX CHOLECYSTECTOMY      HX HYSTERECTOMY      HX SHOULDER ARTHROSCOPY      HX TONSIL AND ADENOIDECTOMY      HX TUBAL LIGATION          Family History   Problem Relation Age of Onset    Hypertension Mother     Kidney Disease Mother     Hypertension Father         Social History     Tobacco Use    Smoking status: Never    Smokeless tobacco: Never   Substance Use Topics    Alcohol use: Never        HPI  Patient here today for follow up of chronic back pain with  PMH of RA, chronic back pain,anemia, sleep apnea, hypertension, anxiety,obesity,overactive bladder and chronic right shoulder pain s/p rotator cuff repair. States that back pain remains persistent and having difficulty with normal activities due to pain. Has attempted physical therapy and medication options without much improvement. Was advised regarding possible injections or surgical intervention but does not want to proceed with any surgery as she is concerned this may not be successful. Is taking cymbalta and tylenol prn    Current Outpatient Medications on File Prior to Visit   Medication Sig    DULoxetine (CYMBALTA) 30 mg capsule take 1 capsule by mouth once daily    hydroCHLOROthiazide (HYDRODIURIL) 25 mg tablet Take 1 Tablet by mouth daily. losartan (COZAAR) 50 mg tablet Take 1 Tablet by mouth daily. estradioL (ESTRACE) 1 mg tablet Take 1 mg by mouth daily. diclofenac (VOLTAREN) 1 % gel Apply 2 g to affected area four (4) times daily.     oxybutynin (DITROPAN) 5 mg tablet Take 5 mg by mouth two (2) times a day. methotrexate (RHEUMATREX) 2.5 mg tablet TAKE 8 TABLETS BY MOUTH EVERY 7 DAYS**4 TABS TUESDAY MORNING AND 4 TABS TUESDAY EVENING    folic acid (FOLVITE) 1 mg tablet Take 1 mg by mouth daily. No current facility-administered medications on file prior to visit. No orders of the defined types were placed in this encounter. Review of Systems   Constitutional:  Negative for chills, fever and weight loss. HENT: Negative. Eyes: Negative. Respiratory: Negative. Cardiovascular: Negative. Gastrointestinal: Negative. Genitourinary:  Negative for dysuria, flank pain, frequency, hematuria and urgency. Musculoskeletal:  Positive for back pain, joint pain and myalgias. Skin: Negative. Neurological:  Positive for tingling. Negative for dizziness, speech change, focal weakness, loss of consciousness, weakness and headaches. Visit Vitals  /87 (BP 1 Location: Left upper arm, BP Patient Position: Sitting, BP Cuff Size: Large adult)   Pulse 81   Temp 97.4 °F (36.3 °C) (Temporal)   Resp 20   Ht 5' 5\" (1.651 m)   Wt 264 lb (119.7 kg)   SpO2 97%   BMI 43.93 kg/m²       Physical Exam  Vitals and nursing note reviewed. Constitutional:       Appearance: Normal appearance. She is obese. Eyes:      Pupils: Pupils are equal, round, and reactive to light. Neck:      Vascular: No carotid bruit. Cardiovascular:      Rate and Rhythm: Normal rate and regular rhythm. Pulses: Normal pulses. Heart sounds: Normal heart sounds. Pulmonary:      Effort: Pulmonary effort is normal.      Breath sounds: Normal breath sounds. Abdominal:      General: Bowel sounds are normal.      Palpations: Abdomen is soft. Tenderness: There is no abdominal tenderness. There is no guarding. Musculoskeletal:      Cervical back: Normal.      Thoracic back: Normal.      Lumbar back: Spasms and tenderness present. Decreased range of motion.  Positive right straight leg raise test and positive left straight leg raise test.      Right lower leg: No edema. Left lower leg: No edema. Lymphadenopathy:      Cervical: No cervical adenopathy. Skin:     General: Skin is warm and dry. Neurological:      Mental Status: She is alert and oriented to person, place, and time. Mental status is at baseline. Sensory: No sensory deficit. Gait: Gait abnormal.      Deep Tendon Reflexes: Reflexes abnormal.   Psychiatric:         Mood and Affect: Mood normal.         Behavior: Behavior normal.          1. Chronic midline low back pain with left-sided sciatica  MRI Results (most recent):  Results from Hospital Encounter encounter on 02/15/22    MRI LUMB SPINE WO CONT    Narrative  MRI LUMBAR SPINE WITHOUT INTRAVENOUS CONTRAST:    CLINICAL HISTORY:  Back pain radiating into the left leg. T1 and T2 weighted images were obtained in the axial and sagittal planes. At the L1-L2 level , no disc herniation or central or foraminal stenoses is  noted. At the L2-L3 level, no disc herniation or central or foraminal stenoses is  noted. Mild degenerative changes of the facet joints are noted. At the L3-L4 level, mild bulging of the posterior annulus of the disc is noted. Mild to moderate degenerative changes of the facet joints are noted. No central  or foraminal stenoses is noted. At the L4-L5 level,  desiccation and mild narrowing of the disc space is noted. Central and to the right disc protrusion is noted with possible compression of  the right L5 nerve in the right lateral recess. Mild foraminal stenoses is noted  bilaterally. Mild degenerative changes of the facet joints and mild thickening  of the ligamentum flavum are causing mild central stenosis. At the L5-S1 level,  mild bulging of the posterior annulus of the disc is noted. Mild foraminal stenoses on the left is noted. Mild to moderate degenerative  changes of the facet joints are noted.  No significant central stenoses is noted. The conus medullaris  has a normal MR appearance. Tip of conus medullaris  terminates slightly above the L2-L3 disc space. The visualized bone marrow has a normal MR appearance. No acute or chronic  fracture or spondylolisthesis was demonstrated. Impression  Degenerative disc disease L4-L5 with  focal disc protrusion on the  right and possible compression of the right L5 nerve within the right lateral  recess. Mild central stenosis. 2. Morbid obesity ([address]      She would like to be evaluated with another pain management provider and will send referral today  Continue to encourage weight loss with decreasing excess fat, salt and sugar in diet along with getting low impact regular exercise as tolerated  Continue tylenol , lidocaine patches and heat prn  Encourage home exercises  She is on Cymbalta and not tolerant of gabapentin or lyrica in past        Patient verbalizes understanding of plan of care as discussed above    Follow-up and Dispositions    Return in about 3 months (around 12/14/2022) for or sooner for worsening symptoms.

## 2023-01-27 ENCOUNTER — OFFICE VISIT (OUTPATIENT)
Dept: PRIMARY CARE CLINIC | Age: 48
End: 2023-01-27
Payer: COMMERCIAL

## 2023-01-27 VITALS
TEMPERATURE: 98.1 F | WEIGHT: 289.8 LBS | HEART RATE: 79 BPM | SYSTOLIC BLOOD PRESSURE: 146 MMHG | BODY MASS INDEX: 48.28 KG/M2 | HEIGHT: 65 IN | RESPIRATION RATE: 18 BRPM | OXYGEN SATURATION: 95 % | DIASTOLIC BLOOD PRESSURE: 82 MMHG

## 2023-01-27 DIAGNOSIS — M25.561 ACUTE PAIN OF RIGHT KNEE: ICD-10-CM

## 2023-01-27 DIAGNOSIS — F32.A DEPRESSION, UNSPECIFIED DEPRESSION TYPE: Primary | ICD-10-CM

## 2023-01-27 DIAGNOSIS — M06.9 RHEUMATOID ARTHRITIS INVOLVING RIGHT KNEE, UNSPECIFIED WHETHER RHEUMATOID FACTOR PRESENT (HCC): ICD-10-CM

## 2023-01-27 PROCEDURE — 3074F SYST BP LT 130 MM HG: CPT | Performed by: NURSE PRACTITIONER

## 2023-01-27 PROCEDURE — 3078F DIAST BP <80 MM HG: CPT | Performed by: NURSE PRACTITIONER

## 2023-01-27 PROCEDURE — 99214 OFFICE O/P EST MOD 30 MIN: CPT | Performed by: NURSE PRACTITIONER

## 2023-01-27 RX ORDER — TRAMADOL HYDROCHLORIDE 50 MG/1
50 TABLET ORAL
Qty: 12 TABLET | Refills: 0 | Status: SHIPPED | OUTPATIENT
Start: 2023-01-27 | End: 2023-01-30

## 2023-01-27 RX ORDER — PREDNISONE 20 MG/1
TABLET ORAL
Qty: 9 TABLET | Refills: 0 | Status: SHIPPED | OUTPATIENT
Start: 2023-01-27

## 2023-01-27 RX ORDER — BUPROPION HYDROCHLORIDE 150 MG/1
150 TABLET ORAL DAILY
Qty: 30 TABLET | Refills: 2 | Status: SHIPPED | OUTPATIENT
Start: 2023-01-27

## 2023-01-27 NOTE — PROGRESS NOTES
Chief Complaint   Patient presents with    Knee Pain    ED Follow-up     Pt stated she was just walking form her bedroom to living room and turned her knee wrong. Pt did not take any of her BP meds today       1. Have you been to the ER, urgent care clinic since your last visit? Hospitalized since your last visit? In chart     2. Have you seen or consulted any other health care providers outside of the [address] [place] [address] since your last visit? Include any pap smears or colon screening.  No

## 2023-01-31 NOTE — PROGRESS NOTES
Jennifer MANTILLA Brionna Lilly is a 46 y.o. female who presents to the office today for the following:    Chief Complaint   Patient presents with    Knee Pain    ED Follow-up    Depression       Past Medical History:   Diagnosis Date    Bursitis     Generalized anxiety disorder 2011    Hypertension     Iron deficiency anemia 3/2/2017    Major depression, single episode 10/26/2016    RA (rheumatoid arthritis) ([address]     Sleep apnea 2013       Past Surgical History:   Procedure Laterality Date    HX  SECTION      HX CHOLECYSTECTOMY      HX HYSTERECTOMY      HX SHOULDER ARTHROSCOPY      HX TONSIL AND ADENOIDECTOMY      HX TUBAL LIGATION          Family History   Problem Relation Age of Onset    Hypertension Mother     Kidney Disease Mother     Hypertension Father         Social History     Tobacco Use    Smoking status: Never    Smokeless tobacco: Never   Substance Use Topics    Alcohol use: Never        HPI  Patient here today with concerns regarding right knee pain over the past week. States that she was walking across room when felt sudden pain. Has been hurting persistently since then. Was having pain in the left knee about a month ago when seen in ED but this is improved. Has history of rheumatoid arthritis but has not seen rheumatologist recently. Does report increased depression over the past several months. Denies any suicidal ideations but has had low motivation and weight gain as a result. Feels limited by her joint pains and weight. Has been diagnosed with depression in past and was put on cymbalta to help with joint pain and depression. Does not feel this works as well as it used to.      Current Outpatient Medications on File Prior to Visit   Medication Sig    hydroCHLOROthiazide (HYDRODIURIL) 25 mg tablet take 1 tablet by mouth once daily    losartan (COZAAR) 50 mg tablet take 1 tablet by mouth once daily    DULoxetine (CYMBALTA) 30 mg capsule take 1 capsule by mouth once daily    oxybutynin (DITROPAN) 5 mg tablet Take 1 Tablet by mouth two (2) times a day. estradioL (ESTRACE) 1 mg tablet Take 1 mg by mouth daily. diclofenac (VOLTAREN) 1 % gel Apply 2 g to affected area four (4) times daily. methotrexate (RHEUMATREX) 2.5 mg tablet TAKE 8 TABLETS BY MOUTH EVERY 7 DAYS**4 TABS TUESDAY MORNING AND 4 TABS TUESDAY EVENING    folic acid (FOLVITE) 1 mg tablet Take 1 mg by mouth daily. No current facility-administered medications on file prior to visit. Medications Ordered Today   Medications    predniSONE (DELTASONE) 20 mg tablet     Sig: Take 2 tablets by mouth x 3 days then 1 tablet by mouth x 3 days     Dispense:  9 Tablet     Refill:  0    buPROPion XL (WELLBUTRIN XL) 150 mg tablet     Sig: Take 1 Tablet by mouth daily. Dispense:  30 Tablet     Refill:  2    traMADoL (ULTRAM) 50 mg tablet     Sig: Take 1 Tablet by mouth every six (6) hours as needed for Pain for up to 3 days. Max Daily Amount: 200 mg. Dispense:  12 Tablet     Refill:  0        Review of Systems   Constitutional: Negative. Respiratory: Negative. Cardiovascular: Negative. Gastrointestinal: Negative. Genitourinary: Negative. Musculoskeletal:  Positive for back pain, joint pain and myalgias. Negative for falls. Neurological:  Negative for tingling. Psychiatric/Behavioral:  Positive for depression. Negative for hallucinations, substance abuse and suicidal ideas. The patient is not nervous/anxious and does not have insomnia. Visit Vitals  BP (!) 146/82 (BP 1 Location: Left upper arm, BP Patient Position: Sitting, BP Cuff Size: Large adult)   Pulse 79   Temp 98.1 °F (36.7 °C) (Temporal)   Resp 18   Ht 5' 5\" (1.651 m)   Wt 289 lb 12.8 oz (131.5 kg)   SpO2 95%   BMI 48.23 kg/m²       Physical Exam  Vitals and nursing note reviewed. Constitutional:       Appearance: Normal appearance. She is obese. Cardiovascular:      Rate and Rhythm: Normal rate. Pulses: Normal pulses.       Heart sounds: Normal heart sounds. Pulmonary:      Effort: Pulmonary effort is normal.      Breath sounds: Normal breath sounds. Abdominal:      General: Bowel sounds are normal.      Palpations: Abdomen is soft. Tenderness: There is no abdominal tenderness. There is no guarding. Musculoskeletal:         General: Tenderness (medial right knee) present. Right lower leg: No edema. Left lower leg: No edema. Skin:     General: Skin is warm and dry. Neurological:      Mental Status: She is alert and oriented to person, place, and time. Mental status is at baseline. Gait: Gait abnormal.          1. Acute pain of right knee  Will give prednisone taper  Encourage heat and range of motion exercises  Avoid activity that aggravates  Hx of RA contributing as well obesity  Refer to orthopedics to evaluate and treat  - predniSONE (DELTASONE) 20 mg tablet; Take 2 tablets by mouth x 3 days then 1 tablet by mouth x 3 days  Dispense: 9 Tablet; Refill: 0  - REFERRAL TO ORTHOPEDICS      2. Rheumatoid arthritis involving right knee, unspecified whether rheumatoid factor present ([facility]  She was under care of rheumatology but stop attending appointments  Encouraged to schedule follow up  - REFERRAL TO ORTHOPEDICS    3. Depression, unspecified depression type  Reports increased depressive symptoms but no suicidal ideations  Will start on wellbutrin 150mg xl daily  Reviewed use and side effects  She will also continue cymbalta 30 mg daily (not tolerant of higher dose)  Advised if worsening moods or suicidal ideations develop, notify provider immediately or seek care. - buPROPion XL (WELLBUTRIN XL) 150 mg tablet; Take 1 Tablet by mouth daily. Dispense: 30 Tablet; Refill: 2      Medication risks/benefits/costs/interactions/alternatives discussed with patient.   Advised patient to call back or return to office if symptoms worsen/change/persist. If patient cannot reach us or should anything more severe/urgent arise she should proceed directly to the nearest emergency department. Discussed expected course/resolution/complications of diagnosis in detail with patient. Patient given a written after visit summary which includes her diagnoses, current medications and vitals. Patient expressed understanding with the diagnosis and plan. Follow-up and Dispositions    Return in about 4 weeks (around 2/24/2023) for or sooner for worsening symptoms.

## 2023-02-09 DIAGNOSIS — G89.29 CHRONIC MIDLINE LOW BACK PAIN WITH LEFT-SIDED SCIATICA: ICD-10-CM

## 2023-02-09 DIAGNOSIS — M54.42 CHRONIC MIDLINE LOW BACK PAIN WITH LEFT-SIDED SCIATICA: ICD-10-CM

## 2023-02-09 RX ORDER — DULOXETIN HYDROCHLORIDE 30 MG/1
CAPSULE, DELAYED RELEASE ORAL
Qty: 30 CAPSULE | Refills: 2 | Status: SHIPPED | OUTPATIENT
Start: 2023-02-09

## 2023-03-03 ENCOUNTER — OFFICE VISIT (OUTPATIENT)
Dept: PRIMARY CARE CLINIC | Age: 48
End: 2023-03-03

## 2023-03-03 VITALS
TEMPERATURE: 97.3 F | HEART RATE: 81 BPM | BODY MASS INDEX: 48.05 KG/M2 | HEIGHT: 65 IN | WEIGHT: 288.4 LBS | RESPIRATION RATE: 18 BRPM | DIASTOLIC BLOOD PRESSURE: 74 MMHG | SYSTOLIC BLOOD PRESSURE: 139 MMHG | OXYGEN SATURATION: 98 %

## 2023-03-03 DIAGNOSIS — Z12.31 SCREENING MAMMOGRAM, ENCOUNTER FOR: Primary | ICD-10-CM

## 2023-03-03 DIAGNOSIS — M25.561 ACUTE PAIN OF RIGHT KNEE: ICD-10-CM

## 2023-03-03 DIAGNOSIS — F32.A DEPRESSION, UNSPECIFIED DEPRESSION TYPE: ICD-10-CM

## 2023-03-03 DIAGNOSIS — M06.9 RHEUMATOID ARTHRITIS INVOLVING MULTIPLE SITES, UNSPECIFIED WHETHER RHEUMATOID FACTOR PRESENT (HCC): ICD-10-CM

## 2023-03-03 DIAGNOSIS — Z12.11 SCREENING FOR MALIGNANT NEOPLASM OF COLON: ICD-10-CM

## 2023-03-03 RX ORDER — DEXTROMETHORPHAN HYDROBROMIDE, GUAIFENESIN 5; 100 MG/5ML; MG/5ML
650 LIQUID ORAL EVERY 8 HOURS
Qty: 90 TABLET | Refills: 1 | Status: SHIPPED | OUTPATIENT
Start: 2023-03-03

## 2023-03-03 RX ORDER — DICLOFENAC SODIUM 75 MG/1
TABLET, DELAYED RELEASE ORAL
COMMUNITY
Start: 2023-02-10

## 2023-03-03 RX ORDER — PREDNISONE 20 MG/1
TABLET ORAL
Qty: 9 TABLET | Refills: 0 | Status: SHIPPED | OUTPATIENT
Start: 2023-03-03

## 2023-03-03 NOTE — PROGRESS NOTES
Chief Complaint   Patient presents with    Follow-up    Knee Pain     Follow up from fall and injury     1. Have you been to the ER, urgent care clinic since your last visit? Hospitalized since your last visit? No    2. Have you seen or consulted any other health care providers outside of the [address] [place] [address] since your last visit? Include any pap smears or colon screening.  No

## 2023-03-03 NOTE — PROGRESS NOTES
Jennifer Figueroa Se is a 46 y.o. female who presents to the office today for the following:    Chief Complaint   Patient presents with    Follow-up    Knee Pain    Depression       Past Medical History:   Diagnosis Date    Bursitis     Generalized anxiety disorder 2011    Hypertension     Iron deficiency anemia 3/2/2017    Major depression, single episode 10/26/2016    RA (rheumatoid arthritis) ([address]     Sleep apnea 2013       Past Surgical History:   Procedure Laterality Date    HX  SECTION      HX CHOLECYSTECTOMY      HX HYSTERECTOMY      HX SHOULDER ARTHROSCOPY      HX TONSIL AND ADENOIDECTOMY      HX TUBAL LIGATION          Family History   Problem Relation Age of Onset    Hypertension Mother     Kidney Disease Mother     Hypertension Father         Social History     Tobacco Use    Smoking status: Never    Smokeless tobacco: Never   Substance Use Topics    Alcohol use: Never        HPI  Patient here today for 4 week follow up of right knee pain and depression with PMH of RA, depression, obesity, hypertension, chronic back pain and overactive bladder. Since last visit, has had eval with orthopedic and was given cortisone shot in right knee. States that she did not have much improvement with this but did get some relief with the steroids. Does feel like knee is going to give out when walking. Did not have any injury when this started but did feel a sharp pain when worsened. Had not seen the rheumatologist recently when spoke last visit and was going to schedule follow up but has not yet. Not taking the methotrexate recently due to she feels this was not helping like it did in the past. Lastly, feels the addition of wellbutrin has been good and feels moods are much better since taking.      Current Outpatient Medications on File Prior to Visit   Medication Sig    diclofenac EC (VOLTAREN) 75 mg EC tablet take 1 tablet by mouth twice a day if needed with food DO NOT CRUSH, CHEW, AND/OR DIVIDE DULoxetine (CYMBALTA) 30 mg capsule take 1 capsule by mouth once daily    buPROPion XL (WELLBUTRIN XL) 150 mg tablet Take 1 Tablet by mouth daily. hydroCHLOROthiazide (HYDRODIURIL) 25 mg tablet take 1 tablet by mouth once daily    losartan (COZAAR) 50 mg tablet take 1 tablet by mouth once daily    oxybutynin (DITROPAN) 5 mg tablet Take 1 Tablet by mouth two (2) times a day. folic acid (FOLVITE) 1 mg tablet Take 1 mg by mouth daily. [DISCONTINUED] predniSONE (DELTASONE) 20 mg tablet Take 2 tablets by mouth x 3 days then 1 tablet by mouth x 3 days    [DISCONTINUED] estradioL (ESTRACE) 1 mg tablet Take 1 mg by mouth daily. (Patient not taking: Reported on 3/3/2023)    diclofenac (VOLTAREN) 1 % gel Apply 2 g to affected area four (4) times daily. methotrexate (RHEUMATREX) 2.5 mg tablet TAKE 8 TABLETS BY MOUTH EVERY 7 DAYS**4 TABS TUESDAY MORNING AND 4 TABS TUESDAY EVENING (Patient not taking: Reported on 3/3/2023)     No current facility-administered medications on file prior to visit. Medications Ordered Today   Medications    predniSONE (DELTASONE) 20 mg tablet     Sig: Take 2 tablets by mouth x 3 days then 1 tablet by mouth x 3 days     Dispense:  9 Tablet     Refill:  0    acetaminophen (Tylenol Arthritis Pain) 650 mg TbER     Sig: Take 1 Tablet by mouth every eight (8) hours. Dispense:  90 Tablet     Refill:  1        Review of Systems   Constitutional: Negative. Respiratory: Negative. Cardiovascular: Negative. Gastrointestinal: Negative. Genitourinary: Negative. Musculoskeletal:  Positive for back pain, joint pain and myalgias. Negative for falls. Neurological:  Negative for tingling. Psychiatric/Behavioral:  Positive for depression. Negative for hallucinations, substance abuse and suicidal ideas. The patient is not nervous/anxious and does not have insomnia.         Visit Vitals  /74 (BP 1 Location: Left upper arm, BP Patient Position: Sitting, BP Cuff Size: Large adult)   Pulse 81   Temp 97.3 °F (36.3 °C) (Temporal)   Resp 18   Ht 5' 5\" (1.651 m)   Wt 288 lb 6.4 oz (130.8 kg)   SpO2 98%   BMI 47.99 kg/m²       Physical Exam  Vitals and nursing note reviewed. Constitutional:       Appearance: Normal appearance. She is obese. Cardiovascular:      Rate and Rhythm: Normal rate. Pulses: Normal pulses. Heart sounds: Normal heart sounds. Pulmonary:      Effort: Pulmonary effort is normal.      Breath sounds: Normal breath sounds. Abdominal:      General: Bowel sounds are normal.      Palpations: Abdomen is soft. Tenderness: There is no abdominal tenderness. There is no guarding. Musculoskeletal:         General: Tenderness (medial right knee) present. Right lower leg: No edema. Left lower leg: No edema. Skin:     General: Skin is warm and dry. Neurological:      Mental Status: She is alert and oriented to person, place, and time. Mental status is at baseline. Gait: Gait abnormal.          1. Acute pain of right knee  Will give prednisone taper as this did help her previously  Also sending tylenol ER to use prn  Encourage heat and range of motion exercises  Avoid activity that aggravates  Hx of RA contributing as well obesity  Advised that due to the continued giving out, would recommend she follow up sooner with orthopedic to discuss. Also is going to schedule with rheumatologist    2. Rheumatoid arthritis involving right knee, unspecified whether rheumatoid factor present ([address]  She has been off the methotrexate but we discussed resuming until she sees rheumatologist.  States she will plan to scheduled follow up appointment today    3. Depression, unspecified depression type  Symptoms are much improved per patient and will continue on cymbalta 30mg daily and wellbutrin 150mg xl daily. Advised if worsening moods or suicidal ideations develop, notify provider immediately or seek care.     4. Screening mammogram, encounter for  Due for mammogram and sending order today  - [facility] MAMMO BI SCREENING INCL CAD; Future    5. Screening for malignant neoplasm of colon  Due for colonoscopy and sending order today  - COLONOSCOPY; Future  - REFERRAL TO GASTROENTEROLOGY     Medication risks/benefits/costs/interactions/alternatives discussed with patient. Advised patient to call back or return to office if symptoms worsen/change/persist. If patient cannot reach us or should anything more severe/urgent arise she should proceed directly to the nearest emergency department. Discussed expected course/resolution/complications of diagnosis in detail with patient. Patient given a written after visit summary which includes her diagnoses, current medications and vitals. Patient expressed understanding with the diagnosis and plan. Follow-up and Dispositions    Return in about 3 months (around 6/3/2023) for or sooner for worsening symptoms.

## 2023-04-23 DIAGNOSIS — F32.A DEPRESSION, UNSPECIFIED DEPRESSION TYPE: ICD-10-CM

## 2023-04-23 RX ORDER — BUPROPION HYDROCHLORIDE 150 MG/1
TABLET ORAL
Qty: 30 TABLET | Refills: 2 | Status: SHIPPED | OUTPATIENT
Start: 2023-04-23

## 2023-05-01 NOTE — PROGRESS NOTES
LVM that xray results were available to patient. Quality 130: Documentation Of Current Medications In The Medical Record: Current Medications Documented Detail Level: Detailed Quality 110: Preventive Care And Screening: Influenza Immunization: Influenza Immunization not Administered because Patient Refused.

## 2023-05-23 RX ORDER — DULOXETIN HYDROCHLORIDE 30 MG/1
CAPSULE, DELAYED RELEASE ORAL
Qty: 90 CAPSULE | Refills: 1 | Status: SHIPPED | OUTPATIENT
Start: 2023-05-23

## 2023-05-23 RX ORDER — OXYBUTYNIN CHLORIDE 5 MG/1
TABLET ORAL
Qty: 180 TABLET | Refills: 0 | Status: SHIPPED | OUTPATIENT
Start: 2023-05-23

## 2023-05-24 RX ORDER — BUPROPION HYDROCHLORIDE 150 MG/1
1 TABLET ORAL DAILY
COMMUNITY
Start: 2023-04-23 | End: 2023-06-06 | Stop reason: SDUPTHER

## 2023-05-24 RX ORDER — FOLIC ACID 1 MG/1
1 TABLET ORAL DAILY
COMMUNITY
Start: 2021-10-26

## 2023-05-24 RX ORDER — DICLOFENAC SODIUM 75 MG/1
TABLET, DELAYED RELEASE ORAL
COMMUNITY
Start: 2023-02-10 | End: 2023-06-06 | Stop reason: SDUPTHER

## 2023-05-24 RX ORDER — LOSARTAN POTASSIUM 50 MG/1
1 TABLET ORAL DAILY
COMMUNITY
Start: 2022-12-07 | End: 2023-06-12

## 2023-05-24 RX ORDER — OXYBUTYNIN CHLORIDE 5 MG/1
5 TABLET ORAL 2 TIMES DAILY
COMMUNITY
Start: 2022-11-02 | End: 2023-06-06 | Stop reason: ALTCHOICE

## 2023-05-24 RX ORDER — PREDNISONE 20 MG/1
TABLET ORAL
COMMUNITY
Start: 2023-03-03 | End: 2023-06-06 | Stop reason: SDUPTHER

## 2023-05-24 RX ORDER — SENNOSIDES 8.6 MG
650 CAPSULE ORAL EVERY 8 HOURS
COMMUNITY
Start: 2023-03-03 | End: 2023-06-06 | Stop reason: SDUPTHER

## 2023-05-24 RX ORDER — DULOXETIN HYDROCHLORIDE 30 MG/1
1 CAPSULE, DELAYED RELEASE ORAL DAILY
COMMUNITY
Start: 2023-02-09 | End: 2023-06-06 | Stop reason: SDUPTHER

## 2023-05-24 RX ORDER — HYDROCHLOROTHIAZIDE 25 MG/1
1 TABLET ORAL DAILY
COMMUNITY
Start: 2022-12-28 | End: 2023-07-23

## 2023-06-06 ENCOUNTER — OFFICE VISIT (OUTPATIENT)
Facility: CLINIC | Age: 48
End: 2023-06-06
Payer: COMMERCIAL

## 2023-06-06 VITALS
OXYGEN SATURATION: 98 % | WEIGHT: 287.6 LBS | TEMPERATURE: 97.1 F | HEIGHT: 65 IN | HEART RATE: 71 BPM | SYSTOLIC BLOOD PRESSURE: 134 MMHG | RESPIRATION RATE: 20 BRPM | BODY MASS INDEX: 47.92 KG/M2 | DIASTOLIC BLOOD PRESSURE: 75 MMHG

## 2023-06-06 DIAGNOSIS — Z11.59 NEED FOR HEPATITIS C SCREENING TEST: ICD-10-CM

## 2023-06-06 DIAGNOSIS — G89.29 CHRONIC RIGHT SHOULDER PAIN: ICD-10-CM

## 2023-06-06 DIAGNOSIS — E66.01 MORBID (SEVERE) OBESITY DUE TO EXCESS CALORIES (HCC): ICD-10-CM

## 2023-06-06 DIAGNOSIS — Z12.31 SCREENING MAMMOGRAM, ENCOUNTER FOR: ICD-10-CM

## 2023-06-06 DIAGNOSIS — M17.0 PRIMARY OSTEOARTHRITIS OF BOTH KNEES: ICD-10-CM

## 2023-06-06 DIAGNOSIS — N32.81 OVERACTIVE BLADDER: ICD-10-CM

## 2023-06-06 DIAGNOSIS — F32.A DEPRESSION, UNSPECIFIED DEPRESSION TYPE: ICD-10-CM

## 2023-06-06 DIAGNOSIS — I10 ESSENTIAL (PRIMARY) HYPERTENSION: ICD-10-CM

## 2023-06-06 DIAGNOSIS — G89.29 CHRONIC LOW BACK PAIN, UNSPECIFIED BACK PAIN LATERALITY, UNSPECIFIED WHETHER SCIATICA PRESENT: ICD-10-CM

## 2023-06-06 DIAGNOSIS — M54.50 CHRONIC LOW BACK PAIN, UNSPECIFIED BACK PAIN LATERALITY, UNSPECIFIED WHETHER SCIATICA PRESENT: ICD-10-CM

## 2023-06-06 DIAGNOSIS — Z12.11 SCREENING FOR MALIGNANT NEOPLASM OF COLON: ICD-10-CM

## 2023-06-06 DIAGNOSIS — M06.9 RHEUMATOID ARTHRITIS, INVOLVING UNSPECIFIED SITE, UNSPECIFIED WHETHER RHEUMATOID FACTOR PRESENT (HCC): Primary | ICD-10-CM

## 2023-06-06 DIAGNOSIS — M25.511 CHRONIC RIGHT SHOULDER PAIN: ICD-10-CM

## 2023-06-06 DIAGNOSIS — Z11.4 ENCOUNTER FOR SCREENING FOR HIV: ICD-10-CM

## 2023-06-06 PROBLEM — D64.9 ANEMIA: Status: ACTIVE | Noted: 2019-10-03

## 2023-06-06 PROBLEM — J30.9 ALLERGIC RHINITIS: Status: ACTIVE | Noted: 2017-06-16

## 2023-06-06 PROBLEM — S46.019A STRAIN OF ROTATOR CUFF CAPSULE: Status: ACTIVE | Noted: 2020-06-30

## 2023-06-06 PROBLEM — D50.9 IRON DEFICIENCY ANEMIA: Status: ACTIVE | Noted: 2017-03-02

## 2023-06-06 PROBLEM — E55.9 VITAMIN D DEFICIENCY: Status: ACTIVE | Noted: 2021-05-03

## 2023-06-06 PROCEDURE — 3075F SYST BP GE 130 - 139MM HG: CPT | Performed by: NURSE PRACTITIONER

## 2023-06-06 PROCEDURE — 99214 OFFICE O/P EST MOD 30 MIN: CPT | Performed by: NURSE PRACTITIONER

## 2023-06-06 PROCEDURE — 3078F DIAST BP <80 MM HG: CPT | Performed by: NURSE PRACTITIONER

## 2023-06-06 RX ORDER — DULOXETIN HYDROCHLORIDE 60 MG/1
60 CAPSULE, DELAYED RELEASE ORAL DAILY
Qty: 90 CAPSULE | Refills: 1 | Status: SHIPPED | OUTPATIENT
Start: 2023-06-06

## 2023-06-06 RX ORDER — MELOXICAM 15 MG/1
15 TABLET ORAL DAILY
COMMUNITY
Start: 2023-05-12

## 2023-06-06 RX ORDER — BUPROPION HYDROCHLORIDE 150 MG/1
150 TABLET ORAL EVERY MORNING
COMMUNITY

## 2023-06-06 NOTE — PROGRESS NOTES
Mari Suarez is a 50 y.o. female who presents to the office today for the following:    Chief Complaint   Patient presents with    Knee Pain     Pt stated she was here for a follow up after seeing the ortho doctor     Depression        Past Medical History:   Diagnosis Date    Bursitis     Generalized anxiety disorder 2011    Hypertension     Iron deficiency anemia 3/2/2017    Major depression, single episode 10/26/2016    RA (rheumatoid [facility]) [facility]     Sleep apnea 2013       Past Surgical History:   Procedure Laterality Date    HX  SECTION      HX CHOLECYSTECTOMY      HX HYSTERECTOMY      HX SHOULDER ARTHROSCOPY      HX TONSIL AND ADENOIDECTOMY      HX TUBAL LIGATION          Family History   Problem Relation Age of Onset    Hypertension Mother     Kidney Disease Mother     Hypertension Father         Social History     Tobacco Use    Smoking status: Never Smoker    Smokeless tobacco: Never Used   Substance Use Topics    Alcohol use: Never    Drug use: Not on file        HPI  Patient here today as new patient to provider with PMH of RA, OA,chronic back pain,anemia,hypertension, anxiety,obesity, and overactive bladder. Reports taking medicines as noted in chart. Did recently see orthopedic and had euflexxa injections. Has not seen much improvement however in knee pain. Did not return to the rheumatologist yet as she has been caring for daughter who had serious injuries from MVA. Is still experiencing some depressive symptoms which had improved some but worsened again due to stressors. Denies any suicidal ideations. Has been compliant with cymbalta and wellbutrin.     Current Outpatient Medications   Medication Sig Dispense Refill    meloxicam (MOBIC) 15 MG tablet Take 1 tablet by mouth daily with food      buPROPion (WELLBUTRIN XL) 150 MG extended release tablet Take 1 tablet by mouth every morning      DULoxetine (CYMBALTA) 60 MG extended release capsule Take 1 capsule by mouth daily 90

## 2023-06-06 NOTE — PROGRESS NOTES
Chief Complaint   Patient presents with    Knee Pain     Pt stated she was here for a follow up after seeing the ortho doctor      Pt brought in meds went over list pt confirmed     No other c/o or concerns     1. Have you been to the ER, urgent care clinic since your last visit? Hospitalized since your last visit? No    2. Have you seen or consulted any other health care providers outside of the [address] [place] [address] since your last visit? Include any pap smears or colon screening.  No

## 2023-06-07 LAB
ALBUMIN SERPL-MCNC: 4.3 G/DL (ref 3.8–4.8)
ALBUMIN/GLOB SERPL: 1.4 {RATIO} (ref 1.2–2.2)
ALP SERPL-CCNC: 106 IU/L (ref 44–121)
ALT SERPL-CCNC: 17 IU/L (ref 0–32)
AST SERPL-CCNC: 32 IU/L (ref 0–40)
BASOPHILS # BLD AUTO: 0 X10E3/UL (ref 0–0.2)
BASOPHILS NFR BLD AUTO: 1 %
BILIRUB SERPL-MCNC: <0.2 MG/DL (ref 0–1.2)
BUN SERPL-MCNC: 17 MG/DL (ref 6–24)
BUN/CREAT SERPL: 22 (ref 9–23)
CALCIUM SERPL-MCNC: 9.6 MG/DL (ref 8.7–10.2)
CHLORIDE SERPL-SCNC: 99 MMOL/L (ref 96–106)
CHOLEST SERPL-MCNC: 218 MG/DL (ref 100–199)
CO2 SERPL-SCNC: 26 MMOL/L (ref 20–29)
CREAT SERPL-MCNC: 0.79 MG/DL (ref 0.57–1)
EGFRCR SERPLBLD CKD-EPI 2021: 91 ML/MIN/1.73
EOSINOPHIL # BLD AUTO: 0.1 X10E3/UL (ref 0–0.4)
EOSINOPHIL NFR BLD AUTO: 2 %
ERYTHROCYTE [DISTWIDTH] IN BLOOD BY AUTOMATED COUNT: 14.6 % (ref 11.7–15.4)
GLOBULIN SER CALC-MCNC: 3.1 G/DL (ref 1.5–4.5)
GLUCOSE SERPL-MCNC: 92 MG/DL (ref 70–99)
HCT VFR BLD AUTO: 36.7 % (ref 34–46.6)
HDLC SERPL-MCNC: 45 MG/DL
HGB BLD-MCNC: 11.7 G/DL (ref 11.1–15.9)
HIV 1+2 AB+HIV1 P24 AG SERPL QL IA: NON REACTIVE
IMM GRANULOCYTES # BLD AUTO: 0 X10E3/UL (ref 0–0.1)
IMM GRANULOCYTES NFR BLD AUTO: 0 %
LDLC SERPL CALC-MCNC: 142 MG/DL (ref 0–99)
LYMPHOCYTES # BLD AUTO: 1.8 X10E3/UL (ref 0.7–3.1)
LYMPHOCYTES NFR BLD AUTO: 34 %
MCH RBC QN AUTO: 25 PG (ref 26.6–33)
MCHC RBC AUTO-ENTMCNC: 31.9 G/DL (ref 31.5–35.7)
MCV RBC AUTO: 78 FL (ref 79–97)
MONOCYTES # BLD AUTO: 0.5 X10E3/UL (ref 0.1–0.9)
MONOCYTES NFR BLD AUTO: 8 %
NEUTROPHILS # BLD AUTO: 2.9 X10E3/UL (ref 1.4–7)
NEUTROPHILS NFR BLD AUTO: 55 %
PLATELET # BLD AUTO: 204 X10E3/UL (ref 150–450)
POTASSIUM SERPL-SCNC: 4.5 MMOL/L (ref 3.5–5.2)
PROT SERPL-MCNC: 7.4 G/DL (ref 6–8.5)
RBC # BLD AUTO: 4.68 X10E6/UL (ref 3.77–5.28)
SODIUM SERPL-SCNC: 137 MMOL/L (ref 134–144)
TRIGL SERPL-MCNC: 170 MG/DL (ref 0–149)
TSH SERPL DL<=0.005 MIU/L-ACNC: 2.16 UIU/ML (ref 0.45–4.5)
VLDLC SERPL CALC-MCNC: 31 MG/DL (ref 5–40)
WBC # BLD AUTO: 5.4 X10E3/UL (ref 3.4–10.8)

## 2023-07-23 RX ORDER — HYDROCHLOROTHIAZIDE 25 MG/1
TABLET ORAL
Qty: 90 TABLET | Refills: 0 | Status: SHIPPED | OUTPATIENT
Start: 2023-07-23

## 2023-07-24 ENCOUNTER — TELEPHONE (OUTPATIENT)
Facility: CLINIC | Age: 48
End: 2023-07-24

## 2023-07-24 DIAGNOSIS — G89.29 CHRONIC LOW BACK PAIN, UNSPECIFIED BACK PAIN LATERALITY, UNSPECIFIED WHETHER SCIATICA PRESENT: ICD-10-CM

## 2023-07-24 DIAGNOSIS — M06.9 RHEUMATOID ARTHRITIS, INVOLVING UNSPECIFIED SITE, UNSPECIFIED WHETHER RHEUMATOID FACTOR PRESENT (HCC): Primary | ICD-10-CM

## 2023-07-24 DIAGNOSIS — M54.50 CHRONIC LOW BACK PAIN, UNSPECIFIED BACK PAIN LATERALITY, UNSPECIFIED WHETHER SCIATICA PRESENT: ICD-10-CM

## 2023-07-24 NOTE — TELEPHONE ENCOUNTER
----- Message from Ethan Brock sent at 7/24/2023  2:51 PM EDT -----  Subject: Referral Request    Reason for referral request? Patient is requesting a Physical Therapy   referral, for back and arthritis in both knees. Patient would like   something closer to her area. Please contact patient with an update. Provider patient wants to be referred to(if known):     Provider Phone Number(if known):     Additional Information for Provider?   ---------------------------------------------------------------------------  --------------  [address]    [phone]; OK to leave message on voicemail  ---------------------------------------------------------------------------  --------------

## 2023-07-31 RX ORDER — BUPROPION HYDROCHLORIDE 150 MG/1
TABLET ORAL
Qty: 90 TABLET | Refills: 1 | Status: SHIPPED | OUTPATIENT
Start: 2023-07-31

## 2023-09-13 DIAGNOSIS — I10 ESSENTIAL (PRIMARY) HYPERTENSION: ICD-10-CM

## 2023-09-13 RX ORDER — LOSARTAN POTASSIUM 50 MG/1
TABLET ORAL
Qty: 90 TABLET | Refills: 0 | Status: SHIPPED | OUTPATIENT
Start: 2023-09-13

## 2023-10-26 ENCOUNTER — OFFICE VISIT (OUTPATIENT)
Facility: CLINIC | Age: 48
End: 2023-10-26
Payer: COMMERCIAL

## 2023-10-26 VITALS
OXYGEN SATURATION: 98 % | WEIGHT: 278.2 LBS | RESPIRATION RATE: 18 BRPM | DIASTOLIC BLOOD PRESSURE: 80 MMHG | SYSTOLIC BLOOD PRESSURE: 135 MMHG | HEART RATE: 78 BPM | BODY MASS INDEX: 46.35 KG/M2 | TEMPERATURE: 97.6 F | HEIGHT: 65 IN

## 2023-10-26 DIAGNOSIS — F51.01 PRIMARY INSOMNIA: ICD-10-CM

## 2023-10-26 DIAGNOSIS — G43.709 CHRONIC MIGRAINE WITHOUT AURA WITHOUT STATUS MIGRAINOSUS, NOT INTRACTABLE: Primary | ICD-10-CM

## 2023-10-26 PROCEDURE — 3079F DIAST BP 80-89 MM HG: CPT | Performed by: NURSE PRACTITIONER

## 2023-10-26 PROCEDURE — 99214 OFFICE O/P EST MOD 30 MIN: CPT | Performed by: NURSE PRACTITIONER

## 2023-10-26 PROCEDURE — 3075F SYST BP GE 130 - 139MM HG: CPT | Performed by: NURSE PRACTITIONER

## 2023-10-26 RX ORDER — TOPIRAMATE 25 MG/1
TABLET ORAL
Qty: 74 TABLET | Refills: 0 | Status: SHIPPED | OUTPATIENT
Start: 2023-10-26 | End: 2023-12-09

## 2023-10-26 RX ORDER — SUMATRIPTAN 50 MG/1
50 TABLET, FILM COATED ORAL
Qty: 9 TABLET | Refills: 5 | Status: SHIPPED | OUTPATIENT
Start: 2023-10-26 | End: 2023-10-26

## 2023-10-26 NOTE — PROGRESS NOTES
Ernesto Butler is a 50 y.o. female who presents to the office today for the following:    Chief Complaint   Patient presents with    Headache        Past Medical History:   Diagnosis Date    Bursitis     Generalized anxiety disorder 2011    Hypertension     Iron deficiency anemia 3/2/2017    Major depression, single episode 10/26/2016    RA (rheumatoid arthritis) ([address])     Sleep apnea 2013       Past Surgical History:   Procedure Laterality Date    HX  SECTION      HX CHOLECYSTECTOMY      HX HYSTERECTOMY      HX SHOULDER ARTHROSCOPY      HX TONSIL AND ADENOIDECTOMY      HX TUBAL LIGATION          Family History   Problem Relation Age of Onset    Hypertension Mother     Kidney Disease Mother     Hypertension Father         Social History     Tobacco Use    Smoking status: Never Smoker    Smokeless tobacco: Never Used   Substance Use Topics    Alcohol use: Never    Drug use: Not on file        HPI  Patient with PMH of RA, OA,chronic back pain,anemia,hypertension, anxiety,obesity, and overactive bladder who presents with concerns regarding frequent headaches. States that over the past 2 months has had migraines several times per week. When headache occurs, has to lay down as well as take tylenol or excedrin. Was diagnosed in early 25s with migraines and has had episodic recurrences over time. Took medication in past to help control but not sure name of medicine. Has had increased stress with caring for daughter who was in serious accident. Also has not been sleeping well and takes hours before she can fall asleep. Has not been told she snores significantly in past.   Denies any associated numbness, confusion, weakness of extremities or vomiting. Some photophobia and nausea when experiencing headache.       Current Outpatient Medications   Medication Sig Dispense Refill    SUMAtriptan (IMITREX) 50 MG tablet Take 1 tablet by mouth once as needed for Migraine 9 tablet 5    topiramate (TOPAMAX) 25 MG

## 2023-10-26 NOTE — PROGRESS NOTES
Chief Complaint   Patient presents with    Headache     Pt still having issues with headaches     Pt brought in meds went over list in chart, pt confirmed      1. Have you been to the ER, urgent care clinic since your last visit? Hospitalized since your last visit? No    2. Have you seen or consulted any other health care providers outside of the [address] [place] [address] Avenue since your last visit? Include any pap smears or colon screening.  No

## 2023-10-27 RX ORDER — HYDROCHLOROTHIAZIDE 25 MG/1
TABLET ORAL
Qty: 90 TABLET | Refills: 0 | Status: SHIPPED | OUTPATIENT
Start: 2023-10-27

## 2023-11-23 DIAGNOSIS — G43.709 CHRONIC MIGRAINE WITHOUT AURA WITHOUT STATUS MIGRAINOSUS, NOT INTRACTABLE: ICD-10-CM

## 2023-11-25 RX ORDER — TOPIRAMATE 50 MG/1
50 TABLET, FILM COATED ORAL DAILY
Qty: 90 TABLET | Refills: 1 | Status: SHIPPED | OUTPATIENT
Start: 2023-11-25 | End: 2023-12-04 | Stop reason: SDUPTHER

## 2023-12-04 ENCOUNTER — OFFICE VISIT (OUTPATIENT)
Facility: CLINIC | Age: 48
End: 2023-12-04
Payer: COMMERCIAL

## 2023-12-04 VITALS
TEMPERATURE: 96.8 F | RESPIRATION RATE: 18 BRPM | HEIGHT: 65 IN | OXYGEN SATURATION: 98 % | BODY MASS INDEX: 46.52 KG/M2 | HEART RATE: 73 BPM | WEIGHT: 279.2 LBS | DIASTOLIC BLOOD PRESSURE: 75 MMHG | SYSTOLIC BLOOD PRESSURE: 131 MMHG

## 2023-12-04 DIAGNOSIS — G43.709 CHRONIC MIGRAINE WITHOUT AURA WITHOUT STATUS MIGRAINOSUS, NOT INTRACTABLE: Primary | ICD-10-CM

## 2023-12-04 DIAGNOSIS — R06.83 HABITUAL SNORING: ICD-10-CM

## 2023-12-04 DIAGNOSIS — E66.01 CLASS 3 SEVERE OBESITY DUE TO EXCESS CALORIES WITH SERIOUS COMORBIDITY AND BODY MASS INDEX (BMI) OF 45.0 TO 49.9 IN ADULT (HCC): ICD-10-CM

## 2023-12-04 DIAGNOSIS — M17.0 PRIMARY OSTEOARTHRITIS OF BOTH KNEES: ICD-10-CM

## 2023-12-04 PROCEDURE — 99214 OFFICE O/P EST MOD 30 MIN: CPT | Performed by: NURSE PRACTITIONER

## 2023-12-04 PROCEDURE — 3078F DIAST BP <80 MM HG: CPT | Performed by: NURSE PRACTITIONER

## 2023-12-04 PROCEDURE — 3075F SYST BP GE 130 - 139MM HG: CPT | Performed by: NURSE PRACTITIONER

## 2023-12-04 RX ORDER — TOPIRAMATE 50 MG/1
50 TABLET, FILM COATED ORAL 2 TIMES DAILY
Qty: 180 TABLET | Refills: 1 | Status: SHIPPED | OUTPATIENT
Start: 2023-12-04

## 2023-12-04 RX ORDER — MELOXICAM 15 MG/1
15 TABLET ORAL DAILY
Qty: 30 TABLET | Refills: 0 | Status: SHIPPED | OUTPATIENT
Start: 2023-12-04

## 2023-12-04 RX ORDER — HYDROCODONE BITARTRATE AND ACETAMINOPHEN 5; 325 MG/1; MG/1
1 TABLET ORAL EVERY 6 HOURS PRN
Qty: 20 TABLET | Refills: 0 | Status: SHIPPED | OUTPATIENT
Start: 2023-12-04 | End: 2023-12-07

## 2023-12-04 RX ORDER — HYDROXYZINE HYDROCHLORIDE 25 MG/1
25 TABLET, FILM COATED ORAL NIGHTLY PRN
Qty: 30 TABLET | Refills: 0 | Status: SHIPPED | OUTPATIENT
Start: 2023-12-04 | End: 2023-12-14

## 2023-12-04 NOTE — PROGRESS NOTES
Chief Complaint   Patient presents with    Headache     4 week follow up, pt stated they are a little better        No other c/o        1. Have you been to the ER, urgent care clinic since your last visit? Hospitalized since your last visit? No    2. Have you seen or consulted any other health care providers outside of the [address] [place] [address] Avenue since your last visit? Include any pap smears or colon screening.  No

## 2023-12-04 NOTE — PROGRESS NOTES
Jessie Corona is a 50 y.o. female who presents to the office today for the following:    Chief Complaint   Patient presents with    Headache     4 week follow up, pt stated they are a little better         Past Medical History:   Diagnosis Date    Bursitis     Generalized anxiety disorder 2011    Hypertension     Iron deficiency anemia 3/2/2017    Major depression, single episode 10/26/2016    RA (rheumatoid arthritis) ([address])     Sleep apnea 2013       Past Surgical History:   Procedure Laterality Date    HX  SECTION      HX CHOLECYSTECTOMY      HX HYSTERECTOMY      HX SHOULDER ARTHROSCOPY      HX TONSIL AND ADENOIDECTOMY      HX TUBAL LIGATION          Family History   Problem Relation Age of Onset    Hypertension Mother     Kidney Disease Mother     Hypertension Father         Social History     Tobacco Use    Smoking status: Never Smoker    Smokeless tobacco: Never Used   Substance Use Topics    Alcohol use: Never    Drug use: Not on file        HPI  Patient with PMH of RA, OA,chronic back pain,anemia,hypertension, anxiety,obesity, and overactive bladder who presents for 4 week follow up of migraines. States that she has had some improvement with topamax but still experiencing at least 1 or 2 per week. Symptoms when headaches occur are unchanged since prior visit. Continues not sleeping well which has been contributory in headaches. Having increased pain in knees also over past few weeks. Taking tylenol and meloxicam prn but not helping. Has seen orthopedic in past for knees but not recently. No injury. Is very active caring for daughter who has been disable due to injury from accident but no other change in activity.       Current Outpatient Medications   Medication Sig Dispense Refill    meloxicam (MOBIC) 15 MG tablet Take 1 tablet by mouth daily with food 30 tablet 0    topiramate (TOPAMAX) 50 MG tablet Take 1 tablet by mouth 2 times daily 180 tablet 1    losartan (COZAAR) 50 MG tablet take 1

## 2023-12-08 ENCOUNTER — TRANSCRIBE ORDERS (OUTPATIENT)
Facility: HOSPITAL | Age: 48
End: 2023-12-08

## 2023-12-08 DIAGNOSIS — Z12.31 VISIT FOR SCREENING MAMMOGRAM: Primary | ICD-10-CM

## 2023-12-14 DIAGNOSIS — I10 ESSENTIAL (PRIMARY) HYPERTENSION: ICD-10-CM

## 2023-12-14 RX ORDER — LOSARTAN POTASSIUM 50 MG/1
TABLET ORAL
Qty: 90 TABLET | Refills: 1 | Status: SHIPPED | OUTPATIENT
Start: 2023-12-14

## 2023-12-15 ENCOUNTER — HOSPITAL ENCOUNTER (OUTPATIENT)
Facility: HOSPITAL | Age: 48
Discharge: HOME | End: 2023-12-15
Payer: COMMERCIAL

## 2023-12-15 VITALS — WEIGHT: 279 LBS | HEIGHT: 65 IN | BODY MASS INDEX: 46.48 KG/M2

## 2023-12-15 DIAGNOSIS — Z12.31 VISIT FOR SCREENING MAMMOGRAM: ICD-10-CM

## 2023-12-15 PROCEDURE — 77063 BREAST TOMOSYNTHESIS BI: CPT

## 2024-01-12 ENCOUNTER — TELEPHONE (OUTPATIENT)
Age: 49
End: 2024-01-12

## 2024-01-12 NOTE — TELEPHONE ENCOUNTER
Called patient to schedule consultation appointment and patient stated she was at a  and would call back to schedule.

## 2024-01-24 ENCOUNTER — OFFICE VISIT (OUTPATIENT)
Facility: CLINIC | Age: 49
End: 2024-01-24
Payer: COMMERCIAL

## 2024-01-24 VITALS
SYSTOLIC BLOOD PRESSURE: 133 MMHG | HEIGHT: 65 IN | DIASTOLIC BLOOD PRESSURE: 76 MMHG | OXYGEN SATURATION: 98 % | HEART RATE: 78 BPM | TEMPERATURE: 97.3 F | WEIGHT: 80.4 LBS | RESPIRATION RATE: 18 BRPM | BODY MASS INDEX: 13.4 KG/M2

## 2024-01-24 DIAGNOSIS — T50.905A ADVERSE EFFECT OF DRUG, INITIAL ENCOUNTER: ICD-10-CM

## 2024-01-24 DIAGNOSIS — G43.709 CHRONIC MIGRAINE WITHOUT AURA WITHOUT STATUS MIGRAINOSUS, NOT INTRACTABLE: Primary | ICD-10-CM

## 2024-01-24 DIAGNOSIS — R06.83 HABITUAL SNORING: ICD-10-CM

## 2024-01-24 PROCEDURE — 3078F DIAST BP <80 MM HG: CPT | Performed by: NURSE PRACTITIONER

## 2024-01-24 PROCEDURE — 3075F SYST BP GE 130 - 139MM HG: CPT | Performed by: NURSE PRACTITIONER

## 2024-01-24 PROCEDURE — 99213 OFFICE O/P EST LOW 20 MIN: CPT | Performed by: NURSE PRACTITIONER

## 2024-01-24 NOTE — PROGRESS NOTES
Chief Complaint   Patient presents with    Headache     Pt stated she is feeling better      Follow up     Pt did not bring meds, went over list, pt confirmed     1. Have you been to the ER, urgent care clinic since your last visit?  Hospitalized since your last visit?No    2. Have you seen or consulted any other health care providers outside of the [facility] since your last visit?  Include any pap smears or colon screening. No    
relief  Keep headache log  Referring for eval regarding possible sleep apnea and provided contact information to schedule.    2. Habitual snoring  Reports habitual snoring, daytime fatigue and restless sleep  Provided contact information again today as she reports she lost contact information.    3. Medication reaction  She has stopped the hydrocodone and have listed as allergy.         Patient verbalizes understanding of plan of care as discussed above    Return in about 3 months (around 4/24/2024), or if symptoms worsen or fail to improve.

## 2024-01-28 RX ORDER — HYDROCHLOROTHIAZIDE 25 MG/1
TABLET ORAL
Qty: 90 TABLET | Refills: 0 | Status: SHIPPED | OUTPATIENT
Start: 2024-01-28

## 2024-02-28 RX ORDER — MELOXICAM 15 MG/1
15 TABLET ORAL DAILY
Qty: 30 TABLET | Refills: 0 | Status: SHIPPED | OUTPATIENT
Start: 2024-02-28

## 2024-02-28 RX ORDER — BUPROPION HYDROCHLORIDE 150 MG/1
TABLET ORAL
Qty: 90 TABLET | Refills: 1 | Status: SHIPPED | OUTPATIENT
Start: 2024-02-28

## 2024-04-21 RX ORDER — MELOXICAM 15 MG/1
15 TABLET ORAL DAILY
Qty: 30 TABLET | Refills: 0 | Status: SHIPPED | OUTPATIENT
Start: 2024-04-21

## 2024-04-28 RX ORDER — HYDROCHLOROTHIAZIDE 25 MG/1
TABLET ORAL
Qty: 90 TABLET | Refills: 1 | Status: SHIPPED | OUTPATIENT
Start: 2024-04-28

## 2024-05-02 ENCOUNTER — OFFICE VISIT (OUTPATIENT)
Facility: CLINIC | Age: 49
End: 2024-05-02
Payer: COMMERCIAL

## 2024-05-02 VITALS
HEART RATE: 78 BPM | OXYGEN SATURATION: 98 % | HEIGHT: 65 IN | WEIGHT: 284.8 LBS | TEMPERATURE: 97.9 F | DIASTOLIC BLOOD PRESSURE: 86 MMHG | BODY MASS INDEX: 47.45 KG/M2 | RESPIRATION RATE: 20 BRPM | SYSTOLIC BLOOD PRESSURE: 149 MMHG

## 2024-05-02 DIAGNOSIS — M05.79 RHEUMATOID ARTHRITIS INVOLVING MULTIPLE SITES WITH POSITIVE RHEUMATOID FACTOR (HCC): ICD-10-CM

## 2024-05-02 DIAGNOSIS — E78.2 MIXED HYPERLIPIDEMIA: ICD-10-CM

## 2024-05-02 DIAGNOSIS — I10 PRIMARY HYPERTENSION: ICD-10-CM

## 2024-05-02 DIAGNOSIS — G43.709 CHRONIC MIGRAINE WITHOUT AURA WITHOUT STATUS MIGRAINOSUS, NOT INTRACTABLE: Primary | ICD-10-CM

## 2024-05-02 DIAGNOSIS — Z12.11 SCREENING FOR MALIGNANT NEOPLASM OF COLON: ICD-10-CM

## 2024-05-02 DIAGNOSIS — R06.83 HABITUAL SNORING: ICD-10-CM

## 2024-05-02 DIAGNOSIS — M54.50 CHRONIC LOW BACK PAIN, UNSPECIFIED BACK PAIN LATERALITY, UNSPECIFIED WHETHER SCIATICA PRESENT: ICD-10-CM

## 2024-05-02 DIAGNOSIS — G89.29 CHRONIC LOW BACK PAIN, UNSPECIFIED BACK PAIN LATERALITY, UNSPECIFIED WHETHER SCIATICA PRESENT: ICD-10-CM

## 2024-05-02 DIAGNOSIS — M17.0 PRIMARY OSTEOARTHRITIS OF BOTH KNEES: ICD-10-CM

## 2024-05-02 PROBLEM — M06.9 RHEUMATOID ARTHRITIS, INVOLVING UNSPECIFIED SITE, UNSPECIFIED WHETHER RHEUMATOID FACTOR PRESENT (HCC): Status: ACTIVE | Noted: 2024-05-02

## 2024-05-02 PROBLEM — M06.9 RHEUMATOID ARTHRITIS, INVOLVING UNSPECIFIED SITE, UNSPECIFIED WHETHER RHEUMATOID FACTOR PRESENT (HCC): Status: RESOLVED | Noted: 2024-05-02 | Resolved: 2024-05-02

## 2024-05-02 PROCEDURE — 3077F SYST BP >= 140 MM HG: CPT | Performed by: NURSE PRACTITIONER

## 2024-05-02 PROCEDURE — 3079F DIAST BP 80-89 MM HG: CPT | Performed by: NURSE PRACTITIONER

## 2024-05-02 PROCEDURE — 99214 OFFICE O/P EST MOD 30 MIN: CPT | Performed by: NURSE PRACTITIONER

## 2024-05-02 RX ORDER — METHOTREXATE 2.5 MG/1
TABLET ORAL
COMMUNITY

## 2024-05-02 RX ORDER — MEDROXYPROGESTERONE ACETATE 150 MG/ML
50 INJECTION, SUSPENSION INTRAMUSCULAR
COMMUNITY
Start: 2024-02-09

## 2024-05-02 SDOH — ECONOMIC STABILITY: FOOD INSECURITY: WITHIN THE PAST 12 MONTHS, THE FOOD YOU BOUGHT JUST DIDN'T LAST AND YOU DIDN'T HAVE MONEY TO GET MORE.: SOMETIMES TRUE

## 2024-05-02 SDOH — ECONOMIC STABILITY: HOUSING INSECURITY
IN THE LAST 12 MONTHS, WAS THERE A TIME WHEN YOU DID NOT HAVE A STEADY PLACE TO SLEEP OR SLEPT IN A SHELTER (INCLUDING NOW)?: NO

## 2024-05-02 SDOH — ECONOMIC STABILITY: INCOME INSECURITY: HOW HARD IS IT FOR YOU TO PAY FOR THE VERY BASICS LIKE FOOD, HOUSING, MEDICAL CARE, AND HEATING?: SOMEWHAT HARD

## 2024-05-02 SDOH — ECONOMIC STABILITY: FOOD INSECURITY: WITHIN THE PAST 12 MONTHS, YOU WORRIED THAT YOUR FOOD WOULD RUN OUT BEFORE YOU GOT MONEY TO BUY MORE.: SOMETIMES TRUE

## 2024-05-02 NOTE — PROGRESS NOTES
Hernando Mary is a 47 y.o. female who presents to the office today for the following:    Chief Complaint   Patient presents with    Hypertension        Past Medical History:   Diagnosis Date    Bursitis     Generalized anxiety disorder 2011    Hypertension     Iron deficiency anemia 3/2/2017    Major depression, single episode 10/26/2016    RA (rheumatoid arthritis) ([facility])     Sleep apnea 2013      Past Surgical History:   Procedure Laterality Date     SECTION      CHOLECYSTECTOMY      HYSTERECTOMY (CERVIX STATUS UNKNOWN)      SHOULDER ARTHROSCOPY      TONSILLECTOMY AND ADENOIDECTOMY      TUBAL LIGATION          Family History   Problem Relation Age of Onset    Hypertension Father     Kidney Disease Mother     Hypertension Mother         Social History     Socioeconomic History    Marital status: Legally      Spouse name: None    Number of children: None    Years of education: None    Highest education level: None   Tobacco Use    Smoking status: Never    Smokeless tobacco: Never   Substance and Sexual Activity    Alcohol use: Never     Social Determinants of Health     Financial Resource Strain: Medium Risk (2024)    Overall Financial Resource Strain (CARDIA)     Difficulty of Paying Living Expenses: Somewhat hard   Food Insecurity: Food Insecurity Present (2024)    Hunger Vital Sign     Worried About Running Out of Food in the Last Year: Sometimes true     Ran Out of Food in the Last Year: Sometimes true   Transportation Needs: Unknown (2024)    PRAPARE - Transportation     Lack of Transportation (Non-Medical): No   Housing Stability: Unknown (2024)    Housing Stability Vital Sign     Unstable Housing in the Last Year: No        HPI  Patient with PMH of RA, OA,chronic back pain,anemia,hypertension, anxiety,obesity, and overactive bladder who presents for follow up of chronic conditions. States that she is taking medications as directed. Following with rheumatology now

## 2024-05-02 NOTE — PROGRESS NOTES
Chief Complaint   Patient presents with    Hypertension     Follow up     Pt did not bring meds, went over list, pt confirmed     No other c/o    \"Have you been to the ER, urgent care clinic since your last visit?  Hospitalized since your last visit?\"    NO    “Have you seen or consulted any other health care providers outside of [facility] since your last visit?”    NO        “Have you had a colorectal cancer screening such as a colonoscopy/FIT/Cologuard?     pt stated she is due to get another one    No colonoscopy on file  No cologuard on file  No FIT/FOBT on file   No flexible sigmoidoscopy on file         Click Here for Release of Records Request

## 2024-07-03 DIAGNOSIS — I10 ESSENTIAL (PRIMARY) HYPERTENSION: ICD-10-CM

## 2024-07-03 DIAGNOSIS — F32.A DEPRESSION, UNSPECIFIED DEPRESSION TYPE: ICD-10-CM

## 2024-07-03 RX ORDER — DULOXETIN HYDROCHLORIDE 60 MG/1
60 CAPSULE, DELAYED RELEASE ORAL DAILY
Qty: 90 CAPSULE | Refills: 1 | Status: SHIPPED | OUTPATIENT
Start: 2024-07-03

## 2024-07-03 RX ORDER — LOSARTAN POTASSIUM 50 MG/1
TABLET ORAL
Qty: 90 TABLET | Refills: 1 | Status: SHIPPED | OUTPATIENT
Start: 2024-07-03

## 2024-07-05 ENCOUNTER — TELEPHONE (OUTPATIENT)
Facility: CLINIC | Age: 49
End: 2024-07-05

## 2024-07-08 DIAGNOSIS — M05.79 RHEUMATOID ARTHRITIS INVOLVING MULTIPLE SITES WITH POSITIVE RHEUMATOID FACTOR (HCC): Primary | ICD-10-CM

## 2024-07-08 RX ORDER — MELOXICAM 15 MG/1
15 TABLET ORAL DAILY
Qty: 30 TABLET | Refills: 0 | Status: SHIPPED | OUTPATIENT
Start: 2024-07-08

## 2024-08-06 ENCOUNTER — OFFICE VISIT (OUTPATIENT)
Facility: CLINIC | Age: 49
End: 2024-08-06
Payer: COMMERCIAL

## 2024-08-06 VITALS
WEIGHT: 283.6 LBS | SYSTOLIC BLOOD PRESSURE: 150 MMHG | HEART RATE: 77 BPM | DIASTOLIC BLOOD PRESSURE: 92 MMHG | TEMPERATURE: 97.6 F | OXYGEN SATURATION: 98 % | BODY MASS INDEX: 47.25 KG/M2 | RESPIRATION RATE: 18 BRPM | HEIGHT: 65 IN

## 2024-08-06 DIAGNOSIS — M05.79 RHEUMATOID ARTHRITIS INVOLVING MULTIPLE SITES WITH POSITIVE RHEUMATOID FACTOR (HCC): ICD-10-CM

## 2024-08-06 DIAGNOSIS — I10 ESSENTIAL (PRIMARY) HYPERTENSION: ICD-10-CM

## 2024-08-06 DIAGNOSIS — Z12.11 SCREENING FOR MALIGNANT NEOPLASM OF COLON: ICD-10-CM

## 2024-08-06 DIAGNOSIS — G43.709 CHRONIC MIGRAINE WITHOUT AURA WITHOUT STATUS MIGRAINOSUS, NOT INTRACTABLE: ICD-10-CM

## 2024-08-06 DIAGNOSIS — R06.83 HABITUAL SNORING: ICD-10-CM

## 2024-08-06 DIAGNOSIS — M54.50 CHRONIC LOW BACK PAIN, UNSPECIFIED BACK PAIN LATERALITY, UNSPECIFIED WHETHER SCIATICA PRESENT: Primary | ICD-10-CM

## 2024-08-06 DIAGNOSIS — G89.29 CHRONIC LOW BACK PAIN, UNSPECIFIED BACK PAIN LATERALITY, UNSPECIFIED WHETHER SCIATICA PRESENT: Primary | ICD-10-CM

## 2024-08-06 DIAGNOSIS — M17.0 PRIMARY OSTEOARTHRITIS OF BOTH KNEES: ICD-10-CM

## 2024-08-06 DIAGNOSIS — E78.2 MIXED HYPERLIPIDEMIA: ICD-10-CM

## 2024-08-06 PROCEDURE — 3077F SYST BP >= 140 MM HG: CPT | Performed by: NURSE PRACTITIONER

## 2024-08-06 PROCEDURE — 3080F DIAST BP >= 90 MM HG: CPT | Performed by: NURSE PRACTITIONER

## 2024-08-06 PROCEDURE — 99214 OFFICE O/P EST MOD 30 MIN: CPT | Performed by: NURSE PRACTITIONER

## 2024-08-06 RX ORDER — PREDNISONE 20 MG/1
TABLET ORAL
Qty: 9 TABLET | Refills: 0 | Status: SHIPPED | OUTPATIENT
Start: 2024-08-06 | End: 2024-08-11

## 2024-08-06 RX ORDER — LOSARTAN POTASSIUM 100 MG/1
100 TABLET ORAL DAILY
Qty: 90 TABLET | Refills: 1 | Status: SHIPPED | OUTPATIENT
Start: 2024-08-06

## 2024-08-06 RX ORDER — METHOCARBAMOL 500 MG/1
500 TABLET, FILM COATED ORAL 4 TIMES DAILY PRN
Qty: 40 TABLET | Refills: 0 | Status: SHIPPED | OUTPATIENT
Start: 2024-08-06 | End: 2024-08-16

## 2024-08-06 NOTE — PROGRESS NOTES
Chief Complaint   Patient presents with    Hypertension     Follow up     Pt also stated she had a fall 2 weeks ago. no injuries and did not go to the ED    \"Have you been to the ER, urgent care clinic since your last visit?  Hospitalized since your last visit?\"    NO    “Have you seen or consulted any other health care providers outside of [facility] since your last visit?”    NO        “Have you had a colorectal cancer screening such as a colonoscopy/FIT/Cologuard?    Been over 10 years    No colonoscopy on file  No cologuard on file  No FIT/FOBT on file   No flexible sigmoidoscopy on file         Click Here for Release of Records Request    
weakness, loss of consciousness and weakness.         Vitals:    08/06/24 1031   BP: (!) 150/92   Pulse:    Resp:    Temp:    SpO2:           Physical Exam  Vitals and nursing note reviewed.   Constitutional:       Appearance: Normal appearance. She is obese.   HENT:      Right Ear: Tympanic membrane normal.      Left Ear: Tympanic membrane normal.      Mouth/Throat:      Mouth: Mucous membranes are moist.      Pharynx: Oropharynx is clear.   Eyes:      Pupils: Pupils are equal, round, and reactive to light.   Neck:      Vascular: No carotid bruit.   Cardiovascular:      Rate and Rhythm: Normal rate and regular rhythm.      Pulses: Normal pulses.      Heart sounds: Normal heart sounds.   Pulmonary:      Effort: Pulmonary effort is normal.      Breath sounds: Normal breath sounds.   Abdominal:      General: Bowel sounds are normal.      Palpations: Abdomen is soft.      Tenderness: There is no abdominal tenderness. There is no guarding.   Musculoskeletal:      Cervical back: Normal.      Thoracic back: Normal.      Lumbar back: Tenderness present. Decreased range of motion. Negative right straight leg raise test and negative left straight leg raise test.      Right lower leg: No edema.      Left lower leg: No edema.   Lymphadenopathy:      Cervical: No cervical adenopathy.   Skin:     General: Skin is warm and dry.   Neurological:      Mental Status: She is alert and oriented to person, place, and time. Mental status is at baseline.      Sensory: Sensation is intact.      Motor: Motor function is intact.      Coordination: Coordination is intact.      Deep Tendon Reflexes: Reflexes normal.   Psychiatric:         Mood and Affect: Mood normal.         Behavior: Behavior normal.        1. Chronic migraine without aura without status migrainosus, not intractable  Dx early 20s with migraines with intermittent recurrences  < 5 per month  Remains on topamax to 50mg BID   Also on sumatriptan 50mg prn at onset of

## 2024-08-07 LAB
CHOLEST SERPL-MCNC: 212 MG/DL (ref 100–199)
HDLC SERPL-MCNC: 46 MG/DL
LDLC SERPL CALC-MCNC: 135 MG/DL (ref 0–99)
TRIGL SERPL-MCNC: 174 MG/DL (ref 0–149)
TSH SERPL DL<=0.005 MIU/L-ACNC: 1.37 UIU/ML (ref 0.45–4.5)
VLDLC SERPL CALC-MCNC: 31 MG/DL (ref 5–40)

## 2024-08-12 PROBLEM — S46.019A STRAIN OF ROTATOR CUFF CAPSULE: Status: RESOLVED | Noted: 2020-06-30 | Resolved: 2024-08-12

## 2024-08-12 PROBLEM — J30.9 ALLERGIC RHINITIS: Status: RESOLVED | Noted: 2017-06-16 | Resolved: 2024-08-12

## 2024-08-12 PROBLEM — D64.9 ANEMIA: Status: RESOLVED | Noted: 2019-10-03 | Resolved: 2024-08-12

## 2024-08-12 PROBLEM — D50.9 IRON DEFICIENCY ANEMIA: Status: RESOLVED | Noted: 2017-03-02 | Resolved: 2024-08-12

## 2024-08-27 DIAGNOSIS — E78.2 MIXED HYPERLIPIDEMIA: Primary | ICD-10-CM

## 2024-08-27 RX ORDER — ATORVASTATIN CALCIUM 10 MG/1
10 TABLET, FILM COATED ORAL DAILY
Qty: 90 TABLET | Refills: 0 | Status: SHIPPED | OUTPATIENT
Start: 2024-08-27

## 2024-09-04 RX ORDER — BUPROPION HYDROCHLORIDE 150 MG/1
TABLET ORAL
Qty: 90 TABLET | Refills: 1 | Status: SHIPPED | OUTPATIENT
Start: 2024-09-04

## 2024-10-20 DIAGNOSIS — G43.709 CHRONIC MIGRAINE WITHOUT AURA WITHOUT STATUS MIGRAINOSUS, NOT INTRACTABLE: ICD-10-CM

## 2024-10-21 RX ORDER — SUMATRIPTAN 50 MG/1
TABLET, FILM COATED ORAL
Qty: 9 TABLET | Refills: 5 | Status: SHIPPED | OUTPATIENT
Start: 2024-10-21

## 2024-10-23 ENCOUNTER — HOSPITAL ENCOUNTER (EMERGENCY)
Facility: HOSPITAL | Age: 49
Discharge: HOME | End: 2024-10-23
Attending: EMERGENCY MEDICINE
Payer: COMMERCIAL

## 2024-10-23 VITALS
TEMPERATURE: 97.7 F | OXYGEN SATURATION: 100 % | BODY MASS INDEX: 47.15 KG/M2 | WEIGHT: 283 LBS | RESPIRATION RATE: 17 BRPM | SYSTOLIC BLOOD PRESSURE: 155 MMHG | HEART RATE: 78 BPM | HEIGHT: 65 IN | DIASTOLIC BLOOD PRESSURE: 98 MMHG
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
## 2024-10-23 DIAGNOSIS — S40.862A INSECT BITE OF LEFT UPPER ARM, INITIAL ENCOUNTER: Primary | ICD-10-CM

## 2024-10-23 PROCEDURE — 99283 EMERGENCY DEPT VISIT LOW MDM: CPT

## 2024-10-23 PROCEDURE — 6370000000 HC RX 637 (ALT 250 FOR IP): Performed by: EMERGENCY MEDICINE

## 2024-10-23 RX ORDER — FAMOTIDINE 40 MG/1
40 TABLET, FILM COATED ORAL DAILY
Qty: 30 TABLET | Refills: 0 | Status: SHIPPED | OUTPATIENT
Start: 2024-10-23

## 2024-10-23 RX ORDER — HYDROXYZINE PAMOATE 25 MG/1
50 CAPSULE ORAL ONCE
Status: COMPLETED | OUTPATIENT
Start: 2024-10-23 | End: 2024-10-23

## 2024-10-23 RX ORDER — HYDROXYZINE HYDROCHLORIDE 50 MG/1
50 TABLET, FILM COATED ORAL EVERY 8 HOURS PRN
Qty: 30 TABLET | Refills: 0 | Status: SHIPPED | OUTPATIENT
Start: 2024-10-23 | End: 2024-11-02

## 2024-10-23 RX ORDER — CEPHALEXIN 500 MG/1
500 CAPSULE ORAL 4 TIMES DAILY
Qty: 28 CAPSULE | Refills: 0 | Status: SHIPPED | OUTPATIENT
Start: 2024-10-23 | End: 2024-10-30

## 2024-10-23 RX ORDER — HYDROCORTISONE 25 MG/G
CREAM TOPICAL
Qty: 3.5 G | Refills: 0 | Status: SHIPPED | OUTPATIENT
Start: 2024-10-23

## 2024-10-23 RX ADMIN — HYDROXYZINE PAMOATE 50 MG: 25 CAPSULE ORAL at 14:44

## 2024-10-23 ASSESSMENT — PAIN SCALES - GENERAL: PAINLEVEL_OUTOF10: 7

## 2024-10-23 ASSESSMENT — PAIN DESCRIPTION - FREQUENCY: FREQUENCY: CONTINUOUS

## 2024-10-23 ASSESSMENT — PAIN DESCRIPTION - LOCATION: LOCATION: ARM

## 2024-10-23 ASSESSMENT — PAIN DESCRIPTION - PAIN TYPE: TYPE: ACUTE PAIN

## 2024-10-23 ASSESSMENT — PAIN DESCRIPTION - ORIENTATION: ORIENTATION: LEFT

## 2024-10-23 ASSESSMENT — PAIN DESCRIPTION - DESCRIPTORS: DESCRIPTORS: ACHING

## 2024-10-23 ASSESSMENT — PAIN - FUNCTIONAL ASSESSMENT
PAIN_FUNCTIONAL_ASSESSMENT: 0-10
PAIN_FUNCTIONAL_ASSESSMENT: ACTIVITIES ARE NOT PREVENTED

## 2024-10-23 ASSESSMENT — PAIN DESCRIPTION - ONSET: ONSET: SUDDEN

## 2024-10-23 NOTE — ED PROVIDER NOTES
[facility] EMERGENCY DEPT  EMERGENCY DEPARTMENT HISTORY AND PHYSICAL EXAM      Date: 10/23/2024  Patient Name: Hernando Mary  MRN: [id number]  YOB: 1975  Date of evaluation: 10/23/2024  Provider: Keshawn Mohan MD   Note Started: 1:45 PM EDT 10/23/24    HISTORY OF PRESENT ILLNESS     Chief Complaint   Patient presents with    Insect Bite       History Provided By: Patient    HPI: Hernando Mary is a 49 y.o. female saw sister yesterday, felt a welp in left upper arm rubbed alcohol on it. Last night itching and swollen. Was taking benadryl without much relief.     PAST MEDICAL HISTORY   Past Medical History:  Past Medical History:   Diagnosis Date    Bursitis     Chronic low back pain 2021    Generalized anxiety disorder 2011    Hypertension     Iron deficiency anemia 3/2/2017    Major depression, single episode 10/26/2016    RA (rheumatoid arthritis) ([facility])     Sleep apnea 2013       Past Surgical History:  Past Surgical History:   Procedure Laterality Date     SECTION      CHOLECYSTECTOMY      HYSTERECTOMY (CERVIX STATUS UNKNOWN)      SHOULDER ARTHROSCOPY      TONSILLECTOMY AND ADENOIDECTOMY      TUBAL LIGATION         Family History:  Family History   Problem Relation Age of Onset    Hypertension Father     Kidney Disease Mother     Hypertension Mother        Social History:  Social History     Tobacco Use    Smoking status: Never    Smokeless tobacco: Never   Substance Use Topics    Alcohol use: Never       Allergies:  Allergies   Allergen Reactions    Hydrocodone Hives    Abatacept Other (See Comments)     Reaction Type: Allergy; Reaction(s): O/E - flushed,Pruritis of skin of anogenital region    Hydromorphone Other (See Comments)       PCP: Belle Rubio, PASCALE - NP    Current Meds:   No current facility-administered medications for this encounter.     Current Outpatient Medications   Medication Sig Dispense Refill    hydrOXYzine HCl (ATARAX) 50 MG tablet Take 1 tablet

## 2024-10-23 NOTE — ED TRIAGE NOTES
Pt reports possible insect bite to left arm. Pt reports area began itching yesterday with noted redness and swelling. Per pt area began hurting PTA due to hitting it on the door. Last dose of benadryl at 0100 today

## 2024-10-28 RX ORDER — HYDROCHLOROTHIAZIDE 25 MG/1
TABLET ORAL
Qty: 90 TABLET | Refills: 1 | Status: SHIPPED | OUTPATIENT
Start: 2024-10-28

## 2024-11-17 DIAGNOSIS — E78.2 MIXED HYPERLIPIDEMIA: ICD-10-CM

## 2024-11-18 RX ORDER — ATORVASTATIN CALCIUM 10 MG/1
10 TABLET, FILM COATED ORAL DAILY
Qty: 90 TABLET | Refills: 0 | Status: SHIPPED | OUTPATIENT
Start: 2024-11-18

## 2024-11-24 DIAGNOSIS — M05.79 RHEUMATOID ARTHRITIS INVOLVING MULTIPLE SITES WITH POSITIVE RHEUMATOID FACTOR (HCC): ICD-10-CM

## 2024-11-25 RX ORDER — MELOXICAM 15 MG/1
15 TABLET ORAL DAILY
Qty: 30 TABLET | Refills: 0 | Status: SHIPPED | OUTPATIENT
Start: 2024-11-25

## 2024-12-17 DIAGNOSIS — G43.709 CHRONIC MIGRAINE WITHOUT AURA WITHOUT STATUS MIGRAINOSUS, NOT INTRACTABLE: ICD-10-CM

## 2024-12-17 RX ORDER — TOPIRAMATE 50 MG/1
50 TABLET, FILM COATED ORAL 2 TIMES DAILY
Qty: 180 TABLET | Refills: 1 | Status: SHIPPED | OUTPATIENT
Start: 2024-12-17

## 2024-12-21 DIAGNOSIS — M05.79 RHEUMATOID ARTHRITIS INVOLVING MULTIPLE SITES WITH POSITIVE RHEUMATOID FACTOR (HCC): ICD-10-CM

## 2024-12-23 RX ORDER — MELOXICAM 15 MG/1
15 TABLET ORAL DAILY
Qty: 30 TABLET | Refills: 0 | Status: SHIPPED | OUTPATIENT
Start: 2024-12-23

## 2024-12-30 DIAGNOSIS — F32.A DEPRESSION, UNSPECIFIED DEPRESSION TYPE: ICD-10-CM

## 2024-12-30 RX ORDER — DULOXETIN HYDROCHLORIDE 60 MG/1
60 CAPSULE, DELAYED RELEASE ORAL DAILY
Qty: 90 CAPSULE | Refills: 1 | Status: SHIPPED | OUTPATIENT
Start: 2024-12-30

## 2025-01-19 DIAGNOSIS — M05.79 RHEUMATOID ARTHRITIS INVOLVING MULTIPLE SITES WITH POSITIVE RHEUMATOID FACTOR (HCC): ICD-10-CM

## 2025-01-19 RX ORDER — MELOXICAM 15 MG/1
15 TABLET ORAL DAILY
Qty: 30 TABLET | Refills: 0 | Status: SHIPPED | OUTPATIENT
Start: 2025-01-19

## 2025-02-04 DIAGNOSIS — I10 ESSENTIAL (PRIMARY) HYPERTENSION: ICD-10-CM

## 2025-02-04 RX ORDER — LOSARTAN POTASSIUM 100 MG/1
100 TABLET ORAL DAILY
Qty: 90 TABLET | Refills: 0 | Status: SHIPPED | OUTPATIENT
Start: 2025-02-04

## 2025-02-15 DIAGNOSIS — E78.2 MIXED HYPERLIPIDEMIA: ICD-10-CM

## 2025-02-17 RX ORDER — ATORVASTATIN CALCIUM 10 MG/1
10 TABLET, FILM COATED ORAL DAILY
Qty: 30 TABLET | Refills: 0 | Status: SHIPPED | OUTPATIENT
Start: 2025-02-17

## 2025-02-20 DIAGNOSIS — M05.79 RHEUMATOID ARTHRITIS INVOLVING MULTIPLE SITES WITH POSITIVE RHEUMATOID FACTOR (HCC): ICD-10-CM

## 2025-02-20 RX ORDER — MELOXICAM 15 MG/1
15 TABLET ORAL DAILY
Qty: 30 TABLET | Refills: 0 | Status: SHIPPED | OUTPATIENT
Start: 2025-02-20

## 2025-02-28 RX ORDER — BUPROPION HYDROCHLORIDE 150 MG/1
TABLET ORAL
Qty: 90 TABLET | Refills: 1 | Status: SHIPPED | OUTPATIENT
Start: 2025-02-28

## 2025-03-21 DIAGNOSIS — E78.2 MIXED HYPERLIPIDEMIA: ICD-10-CM

## 2025-03-21 RX ORDER — ATORVASTATIN CALCIUM 10 MG/1
10 TABLET, FILM COATED ORAL DAILY
Qty: 30 TABLET | Refills: 0 | Status: SHIPPED | OUTPATIENT
Start: 2025-03-21

## 2025-03-26 ENCOUNTER — OFFICE VISIT (OUTPATIENT)
Facility: CLINIC | Age: 50
End: 2025-03-26
Payer: MEDICAID

## 2025-03-26 VITALS
WEIGHT: 282 LBS | HEART RATE: 75 BPM | SYSTOLIC BLOOD PRESSURE: 151 MMHG | RESPIRATION RATE: 20 BRPM | OXYGEN SATURATION: 96 % | HEIGHT: 65 IN | TEMPERATURE: 97.9 F | BODY MASS INDEX: 46.98 KG/M2 | DIASTOLIC BLOOD PRESSURE: 85 MMHG

## 2025-03-26 DIAGNOSIS — Z12.11 SCREENING FOR MALIGNANT NEOPLASM OF COLON: ICD-10-CM

## 2025-03-26 DIAGNOSIS — I10 ESSENTIAL (PRIMARY) HYPERTENSION: ICD-10-CM

## 2025-03-26 DIAGNOSIS — G43.709 CHRONIC MIGRAINE WITHOUT AURA WITHOUT STATUS MIGRAINOSUS, NOT INTRACTABLE: Primary | ICD-10-CM

## 2025-03-26 PROCEDURE — 3077F SYST BP >= 140 MM HG: CPT | Performed by: NURSE PRACTITIONER

## 2025-03-26 PROCEDURE — 3079F DIAST BP 80-89 MM HG: CPT | Performed by: NURSE PRACTITIONER

## 2025-03-26 PROCEDURE — 96372 THER/PROPH/DIAG INJ SC/IM: CPT | Performed by: NURSE PRACTITIONER

## 2025-03-26 PROCEDURE — 99214 OFFICE O/P EST MOD 30 MIN: CPT | Performed by: NURSE PRACTITIONER

## 2025-03-26 RX ORDER — KETOROLAC TROMETHAMINE 30 MG/ML
60 INJECTION, SOLUTION INTRAMUSCULAR; INTRAVENOUS ONCE
Status: COMPLETED | OUTPATIENT
Start: 2025-03-26 | End: 2025-03-26

## 2025-03-26 RX ORDER — VALSARTAN 160 MG/1
160 TABLET ORAL DAILY
Qty: 90 TABLET | Refills: 1 | Status: SHIPPED | OUTPATIENT
Start: 2025-03-26

## 2025-03-26 RX ADMIN — KETOROLAC TROMETHAMINE 60 MG: 30 INJECTION, SOLUTION INTRAMUSCULAR; INTRAVENOUS at 16:35

## 2025-03-26 SDOH — ECONOMIC STABILITY: HOUSING INSECURITY: IN THE LAST 12 MONTHS, WAS THERE A TIME WHEN YOU WERE NOT ABLE TO PAY THE MORTGAGE OR RENT ON TIME?: YES

## 2025-03-26 SDOH — ECONOMIC STABILITY: FOOD INSECURITY: WITHIN THE PAST 12 MONTHS, THE FOOD YOU BOUGHT JUST DIDN'T LAST AND YOU DIDN'T HAVE MONEY TO GET MORE.: SOMETIMES TRUE

## 2025-03-26 SDOH — ECONOMIC STABILITY: FOOD INSECURITY
WITHIN THE PAST 12 MONTHS, YOU WORRIED THAT YOUR FOOD WOULD RUN OUT BEFORE YOU GOT THE MONEY TO BUY MORE.: SOMETIMES TRUE

## 2025-03-26 SDOH — ECONOMIC STABILITY: TRANSPORTATION INSECURITY: IN THE PAST 12 MONTHS, HAS LACK OF TRANSPORTATION KEPT YOU FROM MEDICAL APPOINTMENTS OR FROM GETTING MEDICATIONS?: NO

## 2025-03-26 ASSESSMENT — ENCOUNTER SYMPTOMS
COLOR CHANGE: 0
EYE DISCHARGE: 0
PHOTOPHOBIA: 0
BACK PAIN: 1
DIARRHEA: 0
COUGH: 0
SHORTNESS OF BREATH: 0
WHEEZING: 0
CHEST TIGHTNESS: 0
APNEA: 0
ABDOMINAL PAIN: 0
ABDOMINAL DISTENTION: 0
CHOKING: 0
TROUBLE SWALLOWING: 0
BLOOD IN STOOL: 0
EYE ITCHING: 0
STRIDOR: 0
VOMITING: 1
SORE THROAT: 0
FACIAL SWELLING: 0
EYE PAIN: 0
SINUS PAIN: 0
EYE REDNESS: 0
CONSTIPATION: 0
NAUSEA: 1

## 2025-03-26 ASSESSMENT — PATIENT HEALTH QUESTIONNAIRE - PHQ9
SUM OF ALL RESPONSES TO PHQ QUESTIONS 1-9: 1
SUM OF ALL RESPONSES TO PHQ QUESTIONS 1-9: 1
1. LITTLE INTEREST OR PLEASURE IN DOING THINGS: NOT AT ALL
SUM OF ALL RESPONSES TO PHQ QUESTIONS 1-9: 1
SUM OF ALL RESPONSES TO PHQ QUESTIONS 1-9: 1
2. FEELING DOWN, DEPRESSED OR HOPELESS: SEVERAL DAYS

## 2025-03-26 ASSESSMENT — ACTIVITIES OF DAILY LIVING (ADL): LACK_OF_TRANSPORTATION: NO

## 2025-03-26 NOTE — PROGRESS NOTES
Chief Complaint   Patient presents with    Back Pain    Headache     \"Have you been to the ER, urgent care clinic since your last visit?  Hospitalized since your last visit?\"    NO    “Have you seen or consulted any other health care providers outside our system since your last visit?”    NO      “Have you had a colorectal cancer screening such as a colonoscopy/FIT/Cologuard?    NO    No colonoscopy on file  No cologuard on file  No FIT/FOBT on file   No flexible sigmoidoscopy on file             BP (!) 151/85 (BP Site: Left Upper Arm, Patient Position: Sitting, BP Cuff Size: Large Adult)   Pulse 75   Temp 97.9 °F (36.6 °C) (Oral)   Resp 20   Ht 1.651 m (5' 5\")   Wt 127.9 kg (282 lb)   SpO2 96%   BMI 46.93 kg/m²

## 2025-03-26 NOTE — PROGRESS NOTES
Hernando Mary is a 47 y.o. female who presents to the office today for the following:    Chief Complaint   Patient presents with    Headache        Past Medical History:   Diagnosis Date    Bursitis     Chronic low back pain 2021    Generalized anxiety disorder 2011    Hypertension     Iron deficiency anemia 3/2/2017    Major depression, single episode 10/26/2016    RA (rheumatoid arthritis) (HCC)     Sleep apnea 2013      Past Surgical History:   Procedure Laterality Date     SECTION      CHOLECYSTECTOMY      HYSTERECTOMY (CERVIX STATUS UNKNOWN)      SHOULDER ARTHROSCOPY      TONSILLECTOMY AND ADENOIDECTOMY      TUBAL LIGATION          Family History   Problem Relation Age of Onset    Hypertension Father     Kidney Disease Mother     Hypertension Mother         Social History     Socioeconomic History    Marital status: Legally      Spouse name: None    Number of children: None    Years of education: None    Highest education level: None   Tobacco Use    Smoking status: Never    Smokeless tobacco: Never   Vaping Use    Vaping status: Never Used   Substance and Sexual Activity    Alcohol use: Never    Drug use: Yes     Types: Marijuana (Weed)     Social Drivers of Health     Financial Resource Strain: Medium Risk (2024)    Overall Financial Resource Strain (CARDIA)     Difficulty of Paying Living Expenses: Somewhat hard   Food Insecurity: Food Insecurity Present (3/26/2025)    Hunger Vital Sign     Worried About Running Out of Food in the Last Year: Sometimes true     Ran Out of Food in the Last Year: Sometimes true   Transportation Needs: No Transportation Needs (3/26/2025)    PRAPARE - Transportation     Lack of Transportation (Medical): No     Lack of Transportation (Non-Medical): No   Housing Stability: High Risk (3/26/2025)    Housing Stability Vital Sign     Unable to Pay for Housing in the Last Year: Yes     Number of Times Moved in the Last Year: 0     Homeless in the

## 2025-04-03 ENCOUNTER — COMMUNITY OUTREACH (OUTPATIENT)
Facility: CLINIC | Age: 50
End: 2025-04-03

## 2025-04-18 DIAGNOSIS — E78.2 MIXED HYPERLIPIDEMIA: ICD-10-CM

## 2025-04-21 RX ORDER — ATORVASTATIN CALCIUM 10 MG/1
10 TABLET, FILM COATED ORAL DAILY
Qty: 90 TABLET | Refills: 1 | Status: SHIPPED | OUTPATIENT
Start: 2025-04-21

## 2025-05-05 RX ORDER — HYDROCHLOROTHIAZIDE 25 MG/1
25 TABLET ORAL DAILY
Qty: 90 TABLET | Refills: 1 | Status: SHIPPED | OUTPATIENT
Start: 2025-05-05

## 2025-05-13 ENCOUNTER — OFFICE VISIT (OUTPATIENT)
Facility: CLINIC | Age: 50
End: 2025-05-13
Payer: COMMERCIAL

## 2025-05-13 VITALS
RESPIRATION RATE: 16 BRPM | HEART RATE: 87 BPM | OXYGEN SATURATION: 96 % | SYSTOLIC BLOOD PRESSURE: 153 MMHG | TEMPERATURE: 97.6 F | BODY MASS INDEX: 48.15 KG/M2 | WEIGHT: 289 LBS | HEIGHT: 65 IN | DIASTOLIC BLOOD PRESSURE: 76 MMHG

## 2025-05-13 DIAGNOSIS — G43.709 CHRONIC MIGRAINE WITHOUT AURA WITHOUT STATUS MIGRAINOSUS, NOT INTRACTABLE: Primary | ICD-10-CM

## 2025-05-13 DIAGNOSIS — I10 ESSENTIAL (PRIMARY) HYPERTENSION: ICD-10-CM

## 2025-05-13 PROCEDURE — 3078F DIAST BP <80 MM HG: CPT | Performed by: NURSE PRACTITIONER

## 2025-05-13 PROCEDURE — 3077F SYST BP >= 140 MM HG: CPT | Performed by: NURSE PRACTITIONER

## 2025-05-13 PROCEDURE — 99213 OFFICE O/P EST LOW 20 MIN: CPT | Performed by: NURSE PRACTITIONER

## 2025-05-13 ASSESSMENT — ENCOUNTER SYMPTOMS
APNEA: 0
CHEST TIGHTNESS: 0
PHOTOPHOBIA: 0
SORE THROAT: 0
BACK PAIN: 1
EYE ITCHING: 0
SHORTNESS OF BREATH: 0
DIARRHEA: 0
FACIAL SWELLING: 0
EYE REDNESS: 0
VOMITING: 1
EYE DISCHARGE: 0
CHOKING: 0
SINUS PAIN: 0
CONSTIPATION: 0
ABDOMINAL PAIN: 0
BLOOD IN STOOL: 0
COUGH: 0
ABDOMINAL DISTENTION: 0
STRIDOR: 0
NAUSEA: 1
WHEEZING: 0
EYE PAIN: 0
COLOR CHANGE: 0
TROUBLE SWALLOWING: 0

## 2025-05-13 NOTE — PROGRESS NOTES
Hernando Mary is a 47 y.o. female who presents to the office today for the following:    Chief Complaint   Patient presents with    Follow-up    Migraine        Past Medical History:   Diagnosis Date    Bursitis     Chronic low back pain 2021    Generalized anxiety disorder 2011    Hypertension     Iron deficiency anemia 3/2/2017    Major depression, single episode 10/26/2016    RA (rheumatoid arthritis) (HCC)     Sleep apnea 2013      Past Surgical History:   Procedure Laterality Date     SECTION      CHOLECYSTECTOMY      HYSTERECTOMY (CERVIX STATUS UNKNOWN)      SHOULDER ARTHROSCOPY      TONSILLECTOMY AND ADENOIDECTOMY      TUBAL LIGATION          Family History   Problem Relation Age of Onset    Hypertension Father     Kidney Disease Mother     Hypertension Mother         Social History     Socioeconomic History    Marital status: Legally      Spouse name: None    Number of children: None    Years of education: None    Highest education level: None   Tobacco Use    Smoking status: Never    Smokeless tobacco: Never   Vaping Use    Vaping status: Never Used   Substance and Sexual Activity    Alcohol use: Never    Drug use: Yes     Types: Marijuana (Weed)     Social Drivers of Health     Financial Resource Strain: Medium Risk (2024)    Overall Financial Resource Strain (CARDIA)     Difficulty of Paying Living Expenses: Somewhat hard   Food Insecurity: Food Insecurity Present (3/26/2025)    Hunger Vital Sign     Worried About Running Out of Food in the Last Year: Sometimes true     Ran Out of Food in the Last Year: Sometimes true   Transportation Needs: No Transportation Needs (3/26/2025)    PRAPARE - Transportation     Lack of Transportation (Medical): No     Lack of Transportation (Non-Medical): No   Housing Stability: High Risk (3/26/2025)    Housing Stability Vital Sign     Unable to Pay for Housing in the Last Year: Yes     Number of Times Moved in the Last Year: 0

## 2025-05-13 NOTE — PROGRESS NOTES
Chief Complaint   Patient presents with    Follow-up     BP (!) 153/76   Pulse 87   Temp 97.6 °F (36.4 °C)   Resp 16   Ht 1.651 m (5' 5\")   Wt 131.1 kg (289 lb)   SpO2 96%   BMI 48.09 kg/m²   Have you been to the ER, urgent care clinic since your last visit?  Hospitalized since your last visit?   NO    Have you seen or consulted any other health care providers outside our system since your last visit?   NO      “Have you had a colorectal cancer screening such as a colonoscopy/FIT/Cologuard?    NO    No colonoscopy on file  No cologuard on file  No FIT/FOBT on file   No flexible sigmoidoscopy on file

## 2025-05-27 ENCOUNTER — CLINICAL SUPPORT (OUTPATIENT)
Facility: CLINIC | Age: 50
End: 2025-05-27
Payer: COMMERCIAL

## 2025-05-27 VITALS — SYSTOLIC BLOOD PRESSURE: 139 MMHG | DIASTOLIC BLOOD PRESSURE: 84 MMHG

## 2025-05-27 DIAGNOSIS — I10 BENIGN ESSENTIAL HYPERTENSION: Primary | ICD-10-CM

## 2025-05-27 PROCEDURE — 99211 OFF/OP EST MAY X REQ PHY/QHP: CPT | Performed by: NURSE PRACTITIONER

## 2025-05-28 LAB
ALBUMIN SERPL-MCNC: 4.1 G/DL (ref 3.8–4.9)
ALP SERPL-CCNC: 125 IU/L (ref 44–121)
ALT SERPL-CCNC: 15 IU/L (ref 0–32)
AST SERPL-CCNC: 27 IU/L (ref 0–40)
BILIRUB SERPL-MCNC: <0.2 MG/DL (ref 0–1.2)
BUN SERPL-MCNC: 13 MG/DL (ref 6–24)
BUN/CREAT SERPL: 19 (ref 9–23)
CALCIUM SERPL-MCNC: 9.5 MG/DL (ref 8.7–10.2)
CHLORIDE SERPL-SCNC: 104 MMOL/L (ref 96–106)
CHOLEST SERPL-MCNC: 154 MG/DL (ref 100–199)
CO2 SERPL-SCNC: 25 MMOL/L (ref 20–29)
CREAT SERPL-MCNC: 0.7 MG/DL (ref 0.57–1)
EGFRCR SERPLBLD CKD-EPI 2021: 104 ML/MIN/1.73
GLOBULIN SER CALC-MCNC: 3 G/DL (ref 1.5–4.5)
GLUCOSE SERPL-MCNC: 88 MG/DL (ref 70–99)
HDLC SERPL-MCNC: 42 MG/DL
LDLC SERPL CALC-MCNC: 87 MG/DL (ref 0–99)
POTASSIUM SERPL-SCNC: 4.6 MMOL/L (ref 3.5–5.2)
PROT SERPL-MCNC: 7.1 G/DL (ref 6–8.5)
SODIUM SERPL-SCNC: 143 MMOL/L (ref 134–144)
TRIGL SERPL-MCNC: 144 MG/DL (ref 0–149)
VLDLC SERPL CALC-MCNC: 25 MG/DL (ref 5–40)

## 2025-06-01 ENCOUNTER — RESULTS FOLLOW-UP (OUTPATIENT)
Facility: CLINIC | Age: 50
End: 2025-06-01

## 2025-06-10 ENCOUNTER — HOSPITAL ENCOUNTER (EMERGENCY)
Facility: HOSPITAL | Age: 50
Discharge: HOME | End: 2025-06-10
Attending: EMERGENCY MEDICINE
Payer: COMMERCIAL

## 2025-06-10 VITALS
HEIGHT: 65 IN | WEIGHT: 293 LBS | RESPIRATION RATE: 18 BRPM | SYSTOLIC BLOOD PRESSURE: 165 MMHG | BODY MASS INDEX: 48.82 KG/M2 | HEART RATE: 89 BPM | DIASTOLIC BLOOD PRESSURE: 84 MMHG | TEMPERATURE: 97.8 F | OXYGEN SATURATION: 98 %

## 2025-06-10 DIAGNOSIS — J02.9 ACUTE PHARYNGITIS, UNSPECIFIED ETIOLOGY: Primary | ICD-10-CM

## 2025-06-10 PROCEDURE — 99283 EMERGENCY DEPT VISIT LOW MDM: CPT

## 2025-06-10 PROCEDURE — 6370000000 HC RX 637 (ALT 250 FOR IP): Performed by: EMERGENCY MEDICINE

## 2025-06-10 RX ORDER — TRAMADOL HYDROCHLORIDE 50 MG/1
50 TABLET ORAL EVERY 6 HOURS PRN
Qty: 12 TABLET | Refills: 0 | Status: SHIPPED | OUTPATIENT
Start: 2025-06-10 | End: 2025-06-13

## 2025-06-10 RX ADMIN — AMOXICILLIN AND CLAVULANATE POTASSIUM 1 TABLET: 875; 125 TABLET, FILM COATED ORAL at 07:24

## 2025-06-10 ASSESSMENT — LIFESTYLE VARIABLES
HOW OFTEN DO YOU HAVE A DRINK CONTAINING ALCOHOL: PATIENT DECLINED
HOW MANY STANDARD DRINKS CONTAINING ALCOHOL DO YOU HAVE ON A TYPICAL DAY: PATIENT DECLINED

## 2025-06-10 ASSESSMENT — ENCOUNTER SYMPTOMS
EYES NEGATIVE: 1
RESPIRATORY NEGATIVE: 1
SORE THROAT: 1

## 2025-06-10 NOTE — ED NOTES
excuse any errors that have escaped final proofreading.)       James, Manuel ALMONTE MD  06/10/25 [id number]

## 2025-07-08 ENCOUNTER — OFFICE VISIT (OUTPATIENT)
Age: 50
End: 2025-07-08
Payer: COMMERCIAL

## 2025-07-08 VITALS
RESPIRATION RATE: 18 BRPM | BODY MASS INDEX: 48.65 KG/M2 | TEMPERATURE: 97.8 F | OXYGEN SATURATION: 95 % | SYSTOLIC BLOOD PRESSURE: 134 MMHG | HEIGHT: 65 IN | DIASTOLIC BLOOD PRESSURE: 82 MMHG | HEART RATE: 80 BPM | WEIGHT: 292 LBS

## 2025-07-08 DIAGNOSIS — R10.10 PAIN OF UPPER ABDOMEN: ICD-10-CM

## 2025-07-08 DIAGNOSIS — Z12.11 SCREENING FOR MALIGNANT NEOPLASM OF COLON: Primary | ICD-10-CM

## 2025-07-08 DIAGNOSIS — R11.0 NAUSEA: ICD-10-CM

## 2025-07-08 DIAGNOSIS — D50.9 IRON DEFICIENCY ANEMIA, UNSPECIFIED IRON DEFICIENCY ANEMIA TYPE: ICD-10-CM

## 2025-07-08 DIAGNOSIS — R14.0 ABDOMINAL BLOATING: ICD-10-CM

## 2025-07-08 PROCEDURE — 3075F SYST BP GE 130 - 139MM HG: CPT | Performed by: INTERNAL MEDICINE

## 2025-07-08 PROCEDURE — 99204 OFFICE O/P NEW MOD 45 MIN: CPT | Performed by: INTERNAL MEDICINE

## 2025-07-08 PROCEDURE — 3079F DIAST BP 80-89 MM HG: CPT | Performed by: INTERNAL MEDICINE

## 2025-07-08 RX ORDER — FLUTICASONE PROPIONATE 50 MCG
SPRAY, SUSPENSION (ML) NASAL
COMMUNITY
Start: 2025-03-31

## 2025-07-08 RX ORDER — POLYETHYLENE GLYCOL 3350 17 G/17G
POWDER, FOR SOLUTION ORAL
Qty: 510 G | Refills: 0 | Status: SHIPPED | OUTPATIENT
Start: 2025-07-08

## 2025-07-08 ASSESSMENT — ENCOUNTER SYMPTOMS
ABDOMINAL DISTENTION: 1
NAUSEA: 1
ANAL BLEEDING: 0
BLOOD IN STOOL: 0
DIARRHEA: 0
CONSTIPATION: 0
ABDOMINAL PAIN: 1
ALLERGIC/IMMUNOLOGIC NEGATIVE: 1
RECTAL PAIN: 0
VOMITING: 0
RESPIRATORY NEGATIVE: 1

## 2025-07-09 ENCOUNTER — TELEPHONE (OUTPATIENT)
Age: 50
End: 2025-07-09

## 2025-07-09 PROBLEM — D50.9 IRON DEFICIENCY ANEMIA, UNSPECIFIED: Status: ACTIVE | Noted: 2025-07-09

## 2025-07-09 PROBLEM — Z12.11 SPECIAL SCREENING FOR MALIGNANT NEOPLASMS, COLON: Status: ACTIVE | Noted: 2025-07-09

## 2025-07-09 NOTE — PROGRESS NOTES
Hernando Mary is a 50 y.o. female who presents today for the following:  Chief Complaint   Patient presents with    New Patient     Would like a colonscopy         Allergies   Allergen Reactions    Hydrocodone Hives    Abatacept Other (See Comments)     Reaction Type: Allergy; Reaction(s): O/E - flushed,Pruritis of skin of anogenital region    Hydromorphone Other (See Comments)    Methocarbamol Hives       Current Outpatient Medications   Medication Sig Dispense Refill    fluticasone (FLONASE) 50 MCG/ACT nasal spray instill 2 sprays into each nostril once daily **SHAKE GENTLY,BEFO...  (REFER TO PRESCRIPTION NOTES).      polyethylene glycol (GLYCOLAX) 17 GM/SCOOP powder Use as directed by physician. 510 g 0    hydroCHLOROthiazide (HYDRODIURIL) 25 MG tablet take 1 tablet by mouth once daily 90 tablet 1    atorvastatin (LIPITOR) 10 MG tablet take 1 tablet by mouth once daily 90 tablet 1    rimegepant sulfate 75 MG TBDP Take 75 mg by mouth daily as needed (acute migraine) 16 tablet 2    valsartan (DIOVAN) 160 MG tablet Take 1 tablet by mouth daily 90 tablet 1    buPROPion (WELLBUTRIN XL) 150 MG extended release tablet take 1 tablet by mouth once daily 90 tablet 1    meloxicam (MOBIC) 15 MG tablet take 1 tablet by mouth once daily with food 30 tablet 0    DULoxetine (CYMBALTA) 60 MG extended release capsule take 1 capsule by mouth once daily 90 capsule 1    hydrocortisone 2.5 % cream Apply topically 2 times daily. 3.5 g 0    folic acid (FOLVITE) 1 MG tablet Take 1 tablet by mouth daily       No current facility-administered medications for this visit.       Past Medical History:   Diagnosis Date    Bursitis     Chronic low back pain 2021    Generalized anxiety disorder 2011    Hypertension     Iron deficiency anemia 3/2/2017    Major depression, single episode 10/26/2016    RA (rheumatoid arthritis) ([facility])     Sleep apnea 2013       Past Surgical History:   Procedure Laterality Date     SECTION

## 2025-07-10 ENCOUNTER — TELEPHONE (OUTPATIENT)
Age: 50
End: 2025-07-10